# Patient Record
Sex: MALE | Race: WHITE | Employment: FULL TIME | URBAN - METROPOLITAN AREA
[De-identification: names, ages, dates, MRNs, and addresses within clinical notes are randomized per-mention and may not be internally consistent; named-entity substitution may affect disease eponyms.]

---

## 2018-04-19 ENCOUNTER — OFFICE VISIT (OUTPATIENT)
Dept: FAMILY MEDICINE CLINIC | Facility: CLINIC | Age: 28
End: 2018-04-19

## 2018-04-19 VITALS
BODY MASS INDEX: 21 KG/M2 | RESPIRATION RATE: 14 BRPM | DIASTOLIC BLOOD PRESSURE: 70 MMHG | TEMPERATURE: 98 F | HEART RATE: 88 BPM | WEIGHT: 150 LBS | SYSTOLIC BLOOD PRESSURE: 100 MMHG | HEIGHT: 71 IN

## 2018-04-19 DIAGNOSIS — J02.0 STREP PHARYNGITIS: Primary | ICD-10-CM

## 2018-04-19 PROCEDURE — 87880 STREP A ASSAY W/OPTIC: CPT | Performed by: NURSE PRACTITIONER

## 2018-04-19 PROCEDURE — 99202 OFFICE O/P NEW SF 15 MIN: CPT | Performed by: NURSE PRACTITIONER

## 2018-04-19 RX ORDER — AMOXICILLIN 875 MG/1
875 TABLET, COATED ORAL 2 TIMES DAILY
Qty: 20 TABLET | Refills: 0 | Status: SHIPPED | OUTPATIENT
Start: 2018-04-19 | End: 2018-04-29

## 2018-04-19 NOTE — PROGRESS NOTES
CHIEF COMPLAINT:   Patient presents with:  Sore Throat      HPI:   Dangelo Trivedi is a 33year old male who presents with sore throat  for 2 days. Onset was quick ,   Symptoms are progressing. Location is reported as mid throat.   Description is reporte NEURO: denies headaches, numbness, tingling in face, or change in balance.     EXAM:   /70   Pulse 88   Temp 98.3 °F (36.8 °C)   Resp 14   Ht 71\"   Wt 150 lb   BMI 20.92 kg/m²   GENERAL: well developed, well nourished, in no apparent distress  SKIN: instructed to consider themselves contagious until being on antibiotics for 24 hours.   instructed to increase water intake and rest.      Meds & Refills for this Visit:    Signed Prescriptions Disp Refills    amoxicillin 875 MG Oral Tab 20 tablet 0      Si Follow-up care  Follow up with your healthcare provider or our staff if you don't get better over the next week.   When to seek medical advice  Call your healthcare provider right away if any of these occur:  · Fever of 100.4ºF (38ºC) or higher, or as direc

## 2018-04-19 NOTE — PATIENT INSTRUCTIONS
Pharyngitis: Strep (Confirmed)    You have had a positive test for strep throat. Strep throat is a contagious illness. It is spread by coughing, kissing or by touching others after touching your mouth or nose.  Symptoms include throat pain that is worse w · Lymph nodes getting larger or becoming soft in the middle  · You can't swallow liquids or you can't open your mouth wide because of throat pain  · Signs of dehydration. These include very dark urine or no urine, sunken eyes, and dizziness.   · Trouble nico

## 2018-10-11 ENCOUNTER — OFFICE VISIT (OUTPATIENT)
Dept: FAMILY MEDICINE CLINIC | Facility: CLINIC | Age: 28
End: 2018-10-11
Payer: COMMERCIAL

## 2018-10-11 ENCOUNTER — LAB ENCOUNTER (OUTPATIENT)
Dept: LAB | Age: 28
End: 2018-10-11
Attending: FAMILY MEDICINE
Payer: COMMERCIAL

## 2018-10-11 VITALS
SYSTOLIC BLOOD PRESSURE: 137 MMHG | WEIGHT: 157 LBS | HEIGHT: 71 IN | DIASTOLIC BLOOD PRESSURE: 87 MMHG | HEART RATE: 88 BPM | BODY MASS INDEX: 21.98 KG/M2 | TEMPERATURE: 97 F

## 2018-10-11 DIAGNOSIS — F41.9 ANXIETY: ICD-10-CM

## 2018-10-11 DIAGNOSIS — R79.89 ELEVATED LIVER FUNCTION TESTS: ICD-10-CM

## 2018-10-11 DIAGNOSIS — R79.89 ELEVATED LIVER FUNCTION TESTS: Primary | ICD-10-CM

## 2018-10-11 DIAGNOSIS — M62.838 NECK MUSCLE SPASM: ICD-10-CM

## 2018-10-11 DIAGNOSIS — G44.209 TENSION HEADACHE: Primary | ICD-10-CM

## 2018-10-11 PROCEDURE — 99214 OFFICE O/P EST MOD 30 MIN: CPT | Performed by: FAMILY MEDICINE

## 2018-10-11 PROCEDURE — 99212 OFFICE O/P EST SF 10 MIN: CPT | Performed by: FAMILY MEDICINE

## 2018-10-11 PROCEDURE — 80076 HEPATIC FUNCTION PANEL: CPT

## 2018-10-11 PROCEDURE — 36415 COLL VENOUS BLD VENIPUNCTURE: CPT

## 2018-10-11 NOTE — PATIENT INSTRUCTIONS
At this time he will try to cope on his own. If in 1 month not better he should really see me.   Today though he will go downstairs and do a liver function test just to make sure that the liver tests are back to normal.

## 2018-10-11 NOTE — PROGRESS NOTES
Patient ID: Hannah Pradhan is a 33year old male. HPI  Patient presents with:  Migraine    I have not seen him for some years. He states he dreads going to work and has stress with his boss. The headaches can happen randomly.   Sometimes it is in t lb (68 kg)  11/20/15 : 142 lb (64.4 kg)  05/02/14 : 130 lb 3.2 oz (59.1 kg)      BMI Readings from Last 6 Encounters:  10/11/18 : 21.90 kg/m²  04/19/18 : 20.92 kg/m²  11/20/15 : 20.37 kg/m²  05/02/14 : 18.68 kg/m²      BP Readings from Last 6 Encounters: regular rhythm and  normal heart sounds. Pulmonary/Chest: Effort normal and breath sounds normal. No respiratory distress. Lymphadenopathy:     Patient has no cervical adenopathy.    Neurological: Patient is alert and oriented to person, place, and christopher

## 2018-10-12 ENCOUNTER — PATIENT MESSAGE (OUTPATIENT)
Dept: FAMILY MEDICINE CLINIC | Facility: CLINIC | Age: 28
End: 2018-10-12

## 2018-10-12 ENCOUNTER — TELEPHONE (OUTPATIENT)
Dept: OTHER | Age: 28
End: 2018-10-12

## 2018-10-12 NOTE — TELEPHONE ENCOUNTER
From: Jagruti Vyas  To: Delaney Nicole DO  Sent: 10/12/2018 9:54 AM CDT  Subject: Test Results Question    Hi,     Do I schedule this ultrasound with you or am I being referred to a different doctor to get this test done?      Thank you,    Cristina Rogers

## 2018-10-12 NOTE — TELEPHONE ENCOUNTER
LMTCB=transfer to triage, see below. ----- Message from Samina Em DO sent at 10/11/2018 11:09 PM CDT -----  Your liver tests are again elevated. I want to do an ultrasound of your liver just to make sure there is nothing wrong with the liver.   Lilly Bustamante Detail Level: Simple Detail Level: Detailed

## 2018-10-24 ENCOUNTER — APPOINTMENT (OUTPATIENT)
Dept: LAB | Age: 28
End: 2018-10-24
Attending: FAMILY MEDICINE
Payer: COMMERCIAL

## 2018-10-24 ENCOUNTER — HOSPITAL ENCOUNTER (OUTPATIENT)
Dept: ULTRASOUND IMAGING | Age: 28
Discharge: HOME | End: 2018-10-24
Attending: FAMILY MEDICINE
Payer: COMMERCIAL

## 2018-10-24 DIAGNOSIS — R79.89 ELEVATED LIVER FUNCTION TESTS: ICD-10-CM

## 2018-10-24 PROCEDURE — 76705 ECHO EXAM OF ABDOMEN: CPT | Performed by: FAMILY MEDICINE

## 2019-01-04 ENCOUNTER — OFFICE VISIT (OUTPATIENT)
Dept: FAMILY MEDICINE CLINIC | Facility: CLINIC | Age: 29
End: 2019-01-04
Payer: COMMERCIAL

## 2019-01-04 VITALS
SYSTOLIC BLOOD PRESSURE: 150 MMHG | HEART RATE: 115 BPM | DIASTOLIC BLOOD PRESSURE: 100 MMHG | OXYGEN SATURATION: 98 % | TEMPERATURE: 99 F | RESPIRATION RATE: 22 BRPM

## 2019-01-04 DIAGNOSIS — R03.0 ELEVATED BLOOD PRESSURE READING: ICD-10-CM

## 2019-01-04 DIAGNOSIS — J02.8 PHARYNGITIS DUE TO OTHER ORGANISM: Primary | ICD-10-CM

## 2019-01-04 LAB
CONTROL LINE PRESENT WITH A CLEAR BACKGROUND (YES/NO): YES YES/NO
STREP GRP A CUL-SCR: NEGATIVE

## 2019-01-04 PROCEDURE — 99213 OFFICE O/P EST LOW 20 MIN: CPT

## 2019-01-04 PROCEDURE — 87880 STREP A ASSAY W/OPTIC: CPT

## 2019-01-04 PROCEDURE — 87081 CULTURE SCREEN ONLY: CPT

## 2019-01-04 RX ORDER — AMOXICILLIN 875 MG/1
875 TABLET, COATED ORAL 2 TIMES DAILY
Qty: 20 TABLET | Refills: 0 | Status: SHIPPED | OUTPATIENT
Start: 2019-01-04 | End: 2019-02-27 | Stop reason: ALTCHOICE

## 2019-01-04 NOTE — PROGRESS NOTES
Lloyd Brennan is a 30year old male. CHIEF COMPLAINT:   Patient presents with:  Sore Throat: for 3-4 days, with congestion      HPI:   Patient presents with 3-4 day history of sore throat.   Patient reports the following associated symptoms:  nasal conge Comfort measures explained and discussed as listed in Patient Instructions    Follow up in 3-5 days if not improving, condition worsens, or fever greater than or equal to 100.4 persists for 72 hours.       Patient Instructions     Pharyngitis (Sore Throat), · Use throat lozenges or numbing throat sprays to help reduce pain. Gargling with warm salt water will also help reduce throat pain. Dissolve 1/2 teaspoon of salt in 1 glass of warm water. Children can sip on juice or a popsicle.  Children 5 years and older · •Can’t swallow liquids, a lot of drooling, or can’t open mouth wide due to throat pain  · Signs of dehydration, such as very dark urine or no urine, sunken eyes, dizziness  · Trouble breathing or noisy breathing  · Muffled voice  · New rash  · Other symp

## 2019-01-06 ENCOUNTER — TELEPHONE (OUTPATIENT)
Dept: FAMILY MEDICINE CLINIC | Facility: CLINIC | Age: 29
End: 2019-01-06

## 2019-01-06 NOTE — TELEPHONE ENCOUNTER
Left message with Negative Strep results.  Call Center number left- [phone]   Reminded pt to follow up here or with PCP for B/P recheck

## 2019-02-27 ENCOUNTER — OFFICE VISIT (OUTPATIENT)
Dept: FAMILY MEDICINE CLINIC | Facility: CLINIC | Age: 29
End: 2019-02-27
Payer: COMMERCIAL

## 2019-02-27 VITALS
DIASTOLIC BLOOD PRESSURE: 70 MMHG | HEIGHT: 71 IN | HEART RATE: 98 BPM | RESPIRATION RATE: 16 BRPM | SYSTOLIC BLOOD PRESSURE: 110 MMHG | WEIGHT: 160 LBS | TEMPERATURE: 98 F | BODY MASS INDEX: 22.4 KG/M2

## 2019-02-27 DIAGNOSIS — Z20.828 EXPOSURE TO INFLUENZA: ICD-10-CM

## 2019-02-27 DIAGNOSIS — B34.9 VIRAL SYNDROME: Primary | ICD-10-CM

## 2019-02-27 LAB
POCT INFLUENZA A: NEGATIVE
POCT INFLUENZA B: NEGATIVE

## 2019-02-27 PROCEDURE — 99213 OFFICE O/P EST LOW 20 MIN: CPT | Performed by: NURSE PRACTITIONER

## 2019-02-27 PROCEDURE — 87502 INFLUENZA DNA AMP PROBE: CPT | Performed by: NURSE PRACTITIONER

## 2019-02-27 NOTE — PROGRESS NOTES
CHIEF COMPLAINT:   Patient presents with:  Viral Syndrome      HPI:   Jagruti Vyas is a 30year old male who presents with flu like symptoms for  1-2 hours. Onset was quick.  Patient had close contact relative who was diagnosed with influenza on the day HEAD: atraumatic, normocephalic, no tenderness  EYES: conjunctiva clear, EOM intact, normal pupils, PERRLA  EARS: Canals are clear, nodischarge/inflammation, TM's are pearly white and intact, no bulging, bony landmarks are visible.   Fluid is present behind A viral illness may cause a number of symptoms such as fever. Other symptoms depend on the part of the body that the virus affects.  If it settles in your nose, throat, and lungs, it may cause cough, sore throat, congestion, runny nose, headache, earache an · Your appetite may be poor, so a light diet is fine. Avoid dehydration by drinking 8 to 12, 8-ounce glasses of fluids each day.  This may include water; orange juice; lemonade; apple, grape, and cranberry juice; clear fruit drinks; electrolyte replacement © 4537-4204 [address]. [address] [facility] [place], [address] [place]. All rights reserved. This information is not intended as a substitute for professional medical care. Always follow your healthcare professional's instructions.         Antony Gomez · Stay home until your fever has been gone for at least 24 hours without using medicine to reduce fever. Follow-up care  Follow up with your healthcare provider, or as advised, if you are not getting better over the next week.   If you are age 72 or older,

## 2019-02-27 NOTE — PATIENT INSTRUCTIONS
Viral Syndrome (Adult)  A viral illness may cause a number of symptoms such as fever. Other symptoms depend on the part of the body that the virus affects.  If it settles in your nose, throat, and lungs, it may cause cough, sore throat, congestion, runny · Your appetite may be poor, so a light diet is fine. Avoid dehydration by drinking 8 to 12, 8-ounce glasses of fluids each day.  This may include water; orange juice; lemonade; apple, grape, and cranberry juice; clear fruit drinks; electrolyte replacement © 1813-4383 [address]. [address] [facility] [place], [address] [place]. All rights reserved. This information is not intended as a substitute for professional medical care. Always follow your healthcare professional's instructions.         Candace Shi · Stay home until your fever has been gone for at least 24 hours without using medicine to reduce fever. Follow-up care  Follow up with your healthcare provider, or as advised, if you are not getting better over the next week.   If you are age 72 or older,

## 2019-03-01 ENCOUNTER — OFFICE VISIT (OUTPATIENT)
Dept: FAMILY MEDICINE CLINIC | Facility: CLINIC | Age: 29
End: 2019-03-01
Payer: COMMERCIAL

## 2019-03-01 VITALS
OXYGEN SATURATION: 98 % | HEART RATE: 90 BPM | TEMPERATURE: 98 F | RESPIRATION RATE: 20 BRPM | BODY MASS INDEX: 22 KG/M2 | WEIGHT: 157 LBS | SYSTOLIC BLOOD PRESSURE: 120 MMHG | DIASTOLIC BLOOD PRESSURE: 51 MMHG

## 2019-03-01 DIAGNOSIS — J02.8 PHARYNGITIS DUE TO OTHER ORGANISM: Primary | ICD-10-CM

## 2019-03-01 DIAGNOSIS — R05.9 COUGH: ICD-10-CM

## 2019-03-01 DIAGNOSIS — J10.1 INFLUENZA B: ICD-10-CM

## 2019-03-01 LAB
CONTROL LINE PRESENT WITH A CLEAR BACKGROUND (YES/NO): YES YES/NO
POCT INFLUENZA A: NEGATIVE
POCT INFLUENZA B: POSITIVE
STREP GRP A CUL-SCR: NEGATIVE

## 2019-03-01 PROCEDURE — 87880 STREP A ASSAY W/OPTIC: CPT

## 2019-03-01 PROCEDURE — 87081 CULTURE SCREEN ONLY: CPT

## 2019-03-01 PROCEDURE — 99213 OFFICE O/P EST LOW 20 MIN: CPT

## 2019-03-01 PROCEDURE — 87502 INFLUENZA DNA AMP PROBE: CPT

## 2019-03-01 RX ORDER — OSELTAMIVIR PHOSPHATE 75 MG/1
75 CAPSULE ORAL 2 TIMES DAILY
Qty: 10 CAPSULE | Refills: 0 | Status: SHIPPED | OUTPATIENT
Start: 2019-03-01 | End: 2020-05-26

## 2019-03-01 NOTE — PROGRESS NOTES
CHIEF COMPLAINT:   Patient presents with:  Sore Throat: 2 days, strep exposure  Cough/URI    HPI:   Janet Farfan is a 30year old male who presents with upper respiratory symptoms for  2 -3 days.   Pt was seen 2 days ago, with a couple of hours of onset HEAD: atraumatic, normocephalic  EYES: conjunctiva non-injected; no drainage  EARS: TM's -dull, no redness.  no bulging, no retraction, no fluid, bony landmarks visible  NOSE: Nasal passages mildly erythematous but not swollen , has clear d/c  THROAT: oral Anyone can get the flu.  But you are more likely to become infected if you:  · Have a weakened immune system  · Work in a healthcare setting where you may be exposed to flu germs  · Live or work with someone who has the flu  · Haven’t had an annual flu shot · Drink lots of fluids such as water, juice, and warm soup. A good rule is to drink enough so that you urinate your normal amount. · Get plenty of rest.  · Ask your healthcare provider what to take for fever and pain.   · Call your provider if your fever i · At home and work, clean phones, computer keyboards, and toys often with disinfectant wipes. · If possible, avoid close contact with others who have the flu or symptoms of the flu.   Handwashing tips  Handwashing is one of the best ways to prevent many co © 7303-3740 [address]. [address] [facility] [address] [place]. All rights reserved. This information is not intended as a substitute for professional medical care. Always follow your healthcare professional's instructions.         The pat

## 2019-03-02 NOTE — PATIENT INSTRUCTIONS
The Flu (Influenza)     The virus that causes the flu spreads through the air in droplets when someone who has the flu coughs, sneezes, laughs, or talks. The flu (influenza) is an infection that affects your respiratory tract.  This tract is made up of The flu usually gets better after 10 (7-14) days or so, but fatigue and cough can linger another week. In some cases, your healthcare provider may prescribe an antiviral medicine. This may help you get well a little sooner.  For the medicine to help, you ne · One of the best ways to avoid the flu is to get a flu vaccine each year. The virus that causes the flu changes from year to year. For that reason, healthcare providers recommend getting the flu vaccine each year, as soon as it's available in your area.  Irwin Byrne · Rub until the gel is gone and your hands are completely dry. Preventing the flu in healthcare settings  The flu is a special concern for people in hospitals and long-term care facilities.  To help prevent the spread of flu, many hospitals and nursing maría elena

## 2020-05-22 ENCOUNTER — TELEPHONE (OUTPATIENT)
Dept: FAMILY MEDICINE CLINIC | Facility: CLINIC | Age: 30
End: 2020-05-22

## 2020-05-26 ENCOUNTER — OFFICE VISIT (OUTPATIENT)
Dept: FAMILY MEDICINE CLINIC | Facility: CLINIC | Age: 30
End: 2020-05-26
Payer: COMMERCIAL

## 2020-05-26 VITALS
DIASTOLIC BLOOD PRESSURE: 75 MMHG | HEIGHT: 71 IN | HEART RATE: 92 BPM | SYSTOLIC BLOOD PRESSURE: 121 MMHG | BODY MASS INDEX: 23.38 KG/M2 | TEMPERATURE: 98 F | WEIGHT: 167 LBS

## 2020-05-26 DIAGNOSIS — Z00.00 ADULT GENERAL MEDICAL EXAM: Primary | ICD-10-CM

## 2020-05-26 DIAGNOSIS — Z23 NEED FOR VACCINATION: ICD-10-CM

## 2020-05-26 PROCEDURE — 99395 PREV VISIT EST AGE 18-39: CPT | Performed by: FAMILY MEDICINE

## 2020-05-26 PROCEDURE — 90471 IMMUNIZATION ADMIN: CPT | Performed by: FAMILY MEDICINE

## 2020-05-26 PROCEDURE — 90715 TDAP VACCINE 7 YRS/> IM: CPT | Performed by: FAMILY MEDICINE

## 2020-05-26 NOTE — PROGRESS NOTES
Patient ID: Karli Yen is a 35year old male. HPI  Patient presents with:  Physical    Last seen by me in October 2018. He is single and does not smoke. He has no FHx of diabetes or hypertension.     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 Neurological: He is alert and oriented to person, place, and time. He has normal reflexes. No cranial nerve deficit. Skin: Skin is warm and dry. Few acne papules on the face. Psychiatric: He has a normal mood and affect. Vitals reviewed.

## 2021-03-09 ENCOUNTER — OFFICE VISIT (OUTPATIENT)
Dept: ORTHOPEDICS CLINIC | Facility: CLINIC | Age: 31
End: 2021-03-09
Payer: COMMERCIAL

## 2021-03-09 DIAGNOSIS — B35.1 ONYCHOMYCOSIS: Primary | ICD-10-CM

## 2021-03-09 PROCEDURE — 99202 OFFICE O/P NEW SF 15 MIN: CPT | Performed by: PODIATRIST

## 2021-03-09 NOTE — PROGRESS NOTES
[facility] New Patient Note    CC: Patient presents with:  Toenail Fungus: Patient is here today due to having great right toe nail fungus. Patient states that the issue has been on going for about 4 years.       HPI: The patient is a [address] [facility] year ol definitely a topical treatment. Rx for Penlac will be started      Jose. Alex Vizcaino DPM  [facility]      This document was partially prepared using [employer].

## 2021-03-10 PROCEDURE — 87101 SKIN FUNGI CULTURE: CPT | Performed by: PODIATRIST

## 2021-03-10 PROCEDURE — 87107 FUNGI IDENTIFICATION MOLD: CPT | Performed by: PODIATRIST

## 2021-03-25 RX ORDER — TERBINAFINE HYDROCHLORIDE 250 MG/1
250 TABLET ORAL DAILY
Qty: 30 TABLET | Refills: 2 | Status: SHIPPED | OUTPATIENT
Start: 2021-03-25 | End: 2021-06-17

## 2022-10-27 ENCOUNTER — OFFICE VISIT (OUTPATIENT)
Dept: FAMILY MEDICINE CLINIC | Facility: CLINIC | Age: 32
End: 2022-10-27
Payer: COMMERCIAL

## 2022-10-27 ENCOUNTER — LAB ENCOUNTER (OUTPATIENT)
Dept: LAB | Age: 32
End: 2022-10-27
Attending: NURSE PRACTITIONER
Payer: COMMERCIAL

## 2022-10-27 VITALS
BODY MASS INDEX: 20.86 KG/M2 | HEART RATE: 72 BPM | SYSTOLIC BLOOD PRESSURE: 124 MMHG | WEIGHT: 149 LBS | HEIGHT: 71 IN | DIASTOLIC BLOOD PRESSURE: 83 MMHG

## 2022-10-27 DIAGNOSIS — Z00.00 WELL ADULT EXAM: Primary | ICD-10-CM

## 2022-10-27 DIAGNOSIS — H00.015 HORDEOLUM EXTERNUM OF LEFT LOWER EYELID: ICD-10-CM

## 2022-10-27 DIAGNOSIS — Z00.00 WELL ADULT EXAM: ICD-10-CM

## 2022-10-27 LAB
ALBUMIN SERPL-MCNC: 4.5 G/DL (ref 3.4–5)
ALBUMIN/GLOB SERPL: 1.2 {RATIO} (ref 1–2)
ALP LIVER SERPL-CCNC: 83 U/L
ALT SERPL-CCNC: 38 U/L
ANION GAP SERPL CALC-SCNC: 7 MMOL/L (ref 0–18)
AST SERPL-CCNC: 20 U/L (ref 15–37)
BILIRUB SERPL-MCNC: 1 MG/DL (ref 0.1–2)
BUN BLD-MCNC: 15 MG/DL (ref 7–18)
BUN/CREAT SERPL: 13.5 (ref 10–20)
CALCIUM BLD-MCNC: 9.7 MG/DL (ref 8.5–10.1)
CHLORIDE SERPL-SCNC: 105 MMOL/L (ref 98–112)
CHOLEST SERPL-MCNC: 150 MG/DL (ref ?–200)
CO2 SERPL-SCNC: 28 MMOL/L (ref 21–32)
CREAT BLD-MCNC: 1.11 MG/DL
DEPRECATED RDW RBC AUTO: 36.4 FL (ref 35.1–46.3)
ERYTHROCYTE [DISTWIDTH] IN BLOOD BY AUTOMATED COUNT: 11.3 % (ref 11–15)
EST. AVERAGE GLUCOSE BLD GHB EST-MCNC: 105 MG/DL (ref 68–126)
FASTING PATIENT LIPID ANSWER: YES
FASTING STATUS PATIENT QL REPORTED: YES
GFR SERPLBLD BASED ON 1.73 SQ M-ARVRAT: 91 ML/MIN/1.73M2 (ref 60–?)
GLOBULIN PLAS-MCNC: 3.8 G/DL (ref 2.8–4.4)
GLUCOSE BLD-MCNC: 91 MG/DL (ref 70–99)
HBA1C MFR BLD: 5.3 % (ref ?–5.7)
HCT VFR BLD AUTO: 45.3 %
HDLC SERPL-MCNC: 42 MG/DL (ref 40–59)
HGB BLD-MCNC: 15.7 G/DL
LDLC SERPL CALC-MCNC: 93 MG/DL (ref ?–100)
MCH RBC QN AUTO: 30.7 PG (ref 26–34)
MCHC RBC AUTO-ENTMCNC: 34.7 G/DL (ref 31–37)
MCV RBC AUTO: 88.6 FL
NONHDLC SERPL-MCNC: 108 MG/DL (ref ?–130)
OSMOLALITY SERPL CALC.SUM OF ELEC: 290 MOSM/KG (ref 275–295)
PLATELET # BLD AUTO: 239 10(3)UL (ref 150–450)
POTASSIUM SERPL-SCNC: 4.1 MMOL/L (ref 3.5–5.1)
PROT SERPL-MCNC: 8.3 G/DL (ref 6.4–8.2)
RBC # BLD AUTO: 5.11 X10(6)UL
SODIUM SERPL-SCNC: 140 MMOL/L (ref 136–145)
TRIGL SERPL-MCNC: 76 MG/DL (ref 30–149)
TSI SER-ACNC: 1.98 MIU/ML (ref 0.36–3.74)
VIT B12 SERPL-MCNC: 547 PG/ML (ref 193–986)
VIT D+METAB SERPL-MCNC: 27.8 NG/ML (ref 30–100)
VLDLC SERPL CALC-MCNC: 12 MG/DL (ref 0–30)
WBC # BLD AUTO: 5.7 X10(3) UL (ref 4–11)

## 2022-10-27 PROCEDURE — 3008F BODY MASS INDEX DOCD: CPT | Performed by: NURSE PRACTITIONER

## 2022-10-27 PROCEDURE — 80053 COMPREHEN METABOLIC PANEL: CPT

## 2022-10-27 PROCEDURE — 36415 COLL VENOUS BLD VENIPUNCTURE: CPT | Performed by: NURSE PRACTITIONER

## 2022-10-27 PROCEDURE — 85027 COMPLETE CBC AUTOMATED: CPT

## 2022-10-27 PROCEDURE — 3079F DIAST BP 80-89 MM HG: CPT | Performed by: NURSE PRACTITIONER

## 2022-10-27 PROCEDURE — 82607 VITAMIN B-12: CPT

## 2022-10-27 PROCEDURE — 99395 PREV VISIT EST AGE 18-39: CPT | Performed by: NURSE PRACTITIONER

## 2022-10-27 PROCEDURE — 3074F SYST BP LT 130 MM HG: CPT | Performed by: NURSE PRACTITIONER

## 2022-10-27 PROCEDURE — 80061 LIPID PANEL: CPT

## 2022-10-27 PROCEDURE — 83036 HEMOGLOBIN GLYCOSYLATED A1C: CPT

## 2022-10-27 PROCEDURE — 82306 VITAMIN D 25 HYDROXY: CPT | Performed by: NURSE PRACTITIONER

## 2022-10-27 PROCEDURE — 84443 ASSAY THYROID STIM HORMONE: CPT

## 2022-10-27 NOTE — ASSESSMENT & PLAN NOTE
Discussed self care for styes-warm compresses w a few drops of baby shampoo  Do not squeeze area  Please call if symptoms worsen or are not resolving.

## 2022-10-27 NOTE — ASSESSMENT & PLAN NOTE
Screening labs   Please aim to eat a diet high in fresh fruits and vegetables, lean protein sources, complex carbohydrates and limited processed and fast foods. Try to get at least 150 minutes of exercise per week-a combination of weight resistance and cardio is preferred.     Declines covid and influenza vaccines

## 2023-04-18 ENCOUNTER — PATIENT MESSAGE (OUTPATIENT)
Dept: FAMILY MEDICINE CLINIC | Facility: CLINIC | Age: 33
End: 2023-04-18

## 2023-04-19 NOTE — TELEPHONE ENCOUNTER
[employer] message sent to patient in response to [employer] message received--->see message. From: Marcellus Ba  To: Gisselle Martinez DO  Sent: 4/18/2023  3:07 PM CDT  Subject: Referral to Dermatologist    1. I can't find out how to update my medical coverage. I am now in [facility] of [place] and no longer on PPO. Group # [id number]  ID # [id number]  2. Once I get that updated, how can I get a referral to see a dermatologist through [facility] network? Thanks!   Damien Garcia

## 2023-05-03 ENCOUNTER — OFFICE VISIT (OUTPATIENT)
Dept: FAMILY MEDICINE CLINIC | Facility: CLINIC | Age: 33
End: 2023-05-03

## 2023-05-03 VITALS
BODY MASS INDEX: 21.7 KG/M2 | SYSTOLIC BLOOD PRESSURE: 121 MMHG | HEIGHT: 71 IN | HEART RATE: 69 BPM | DIASTOLIC BLOOD PRESSURE: 81 MMHG | WEIGHT: 155 LBS

## 2023-05-03 DIAGNOSIS — Z12.83 SKIN EXAM FOR MALIGNANT NEOPLASM: Primary | ICD-10-CM

## 2024-01-31 ENCOUNTER — OFFICE VISIT (OUTPATIENT)
Dept: FAMILY MEDICINE CLINIC | Facility: CLINIC | Age: 34
End: 2024-01-31
Payer: COMMERCIAL

## 2024-01-31 VITALS
BODY MASS INDEX: 23.8 KG/M2 | HEIGHT: 71 IN | TEMPERATURE: 97 F | DIASTOLIC BLOOD PRESSURE: 72 MMHG | OXYGEN SATURATION: 96 % | RESPIRATION RATE: 16 BRPM | WEIGHT: 170 LBS | SYSTOLIC BLOOD PRESSURE: 108 MMHG | HEART RATE: 93 BPM

## 2024-01-31 DIAGNOSIS — J06.9 VIRAL URI: Primary | ICD-10-CM

## 2024-01-31 LAB
CONTROL LINE PRESENT WITH A CLEAR BACKGROUND (YES/NO): YES YES/NO
KIT LOT #: NORMAL NUMERIC
STREP GRP A CUL-SCR: NEGATIVE

## 2024-01-31 PROCEDURE — 3074F SYST BP LT 130 MM HG: CPT | Performed by: NURSE PRACTITIONER

## 2024-01-31 PROCEDURE — 3078F DIAST BP <80 MM HG: CPT | Performed by: NURSE PRACTITIONER

## 2024-01-31 PROCEDURE — 99213 OFFICE O/P EST LOW 20 MIN: CPT | Performed by: NURSE PRACTITIONER

## 2024-01-31 PROCEDURE — 3008F BODY MASS INDEX DOCD: CPT | Performed by: NURSE PRACTITIONER

## 2024-01-31 PROCEDURE — 87880 STREP A ASSAY W/OPTIC: CPT | Performed by: NURSE PRACTITIONER

## 2024-01-31 NOTE — PROGRESS NOTES
CHIEF COMPLAINT:     Chief Complaint   Patient presents with    Sore Throat     ST x Monday, HA which is worsening,    Denies fever, N/V, abd pain   OTC Ibuprofen, Mucinex   No recent Covid test        HPI:   Amador Sadler is a 34 year old male who presents for upper respiratory symptoms for  2 days. Patient reports sore throat, HA, nasal congestion/rhinorrhea, cough, temp of 99.8F.  Treating symptoms with: Ibuprofen, mucinex.  Denies fever, dyspnea, wheezing, SOB, chest pain, N/V/D, ear pain, abd pain, or rash.  Denies history of asthma, smoking, pneumonia, or COPD.  No known ill contacts.  Denies recent travel.  Tolerating PO.  Hx of covid a couple years ago.      No current outpatient medications on file.      No past medical history on file.   No past surgical history on file.      Social History     Socioeconomic History    Marital status: Single   Tobacco Use    Smoking status: Never    Smokeless tobacco: Never   Vaping Use    Vaping Use: Never used   Substance and Sexual Activity    Alcohol use: Not Currently     Alcohol/week: 0.0 standard drinks of alcohol    Drug use: No   Other Topics Concern    Caffeine Concern Yes     Comment: Coffee         REVIEW OF SYSTEMS:   GENERAL: feels well otherwise, normal appetite  SKIN: no rashes or abnormal skin lesions  HEENT: See HPI  LUNGS: denies shortness of breath or wheezing, See HPI  CARDIOVASCULAR: denies chest pain or palpitations   GI: denies N/V/C or abdominal pain  NEURO: Denies headaches    EXAM:   /72   Pulse 93   Temp 97.1 °F (36.2 °C)   Resp 16   Ht 5' 11\" (1.803 m)   Wt 170 lb (77.1 kg)   SpO2 96%   BMI 23.71 kg/m²   GENERAL: well developed, well nourished, and in no apparent distress  SKIN: no rashes, no suspicious lesions  HEAD: atraumatic, normocephalic.    EYES: conjunctiva clear, EOM intact  EARS: TM's normal, no bulging, no retraction,no fluid, bony landmarks visible  NOSE: Nostrils patent, +clear nasal discharge, nasal mucosa pink and  +inflamed  THROAT: Oral mucosa pink, moist. Posterior pharynx is+ erythematous. No exudates.   NECK: Supple, non-tender  LUNGS: clear to auscultation bilaterally, no wheezes or rhonchi. Breathing is non labored. +Dry cough.  CARDIO: RRR without murmur.  LYMPH: No lymphadenopathy.        ASSESSMENT AND PLAN:   Amador Sadler is a 34 year old male who presents with     ASSESSMENT:   Encounter Diagnosis   Name Primary?    Viral URI Yes       PLAN:   - Negative rapid strep, declines culture.  - Declines covid test, encouraged home testing.  - Discussed OTC/comfort care as described in Patient Instructions  - Advised F/U visit if no improvement/worsening within 1 week; sooner if new fever or worsening breathing.  To ER if ever dyspnea, chest pain, SOB, dehydration.  - Pt verbalizes understanding and is agreeable w/ plan.    Meds & Refills for this Visit:  Requested Prescriptions      No prescriptions requested or ordered in this encounter       Risks, benefits, and side effects of medication explained and discussed.  Pt verbalizes understanding.    There are no Patient Instructions on file for this visit.

## 2024-05-27 ENCOUNTER — HOSPITAL ENCOUNTER (OUTPATIENT)
Age: 34
Discharge: HOME | End: 2024-05-27

## 2024-05-27 VITALS
WEIGHT: 170 LBS | RESPIRATION RATE: 16 BRPM | OXYGEN SATURATION: 100 % | HEART RATE: 87 BPM | SYSTOLIC BLOOD PRESSURE: 143 MMHG | HEIGHT: 70 IN | BODY MASS INDEX: 24.34 KG/M2 | DIASTOLIC BLOOD PRESSURE: 88 MMHG | TEMPERATURE: 98 F

## 2024-05-27 DIAGNOSIS — L98.9 SKIN ABNORMALITY: Primary | ICD-10-CM

## 2024-05-27 PROCEDURE — 99213 OFFICE O/P EST LOW 20 MIN: CPT | Performed by: NURSE PRACTITIONER

## 2024-05-27 RX ORDER — VALACYCLOVIR HYDROCHLORIDE 1 G/1
1000 TABLET, FILM COATED ORAL 3 TIMES DAILY
Qty: 21 TABLET | Refills: 0 | Status: SHIPPED | OUTPATIENT
Start: 2024-05-27 | End: 2024-06-03

## 2024-05-27 RX ORDER — DOXYCYCLINE HYCLATE 100 MG/1
100 CAPSULE ORAL 2 TIMES DAILY
Qty: 14 CAPSULE | Refills: 0 | Status: SHIPPED | OUTPATIENT
Start: 2024-05-27 | End: 2024-06-03

## 2024-05-27 RX ORDER — ERYTHROMYCIN 5 MG/G
1 OINTMENT OPHTHALMIC EVERY 6 HOURS
Qty: 1 EACH | Refills: 0 | Status: SHIPPED | OUTPATIENT
Start: 2024-05-27 | End: 2024-06-03

## 2024-05-27 RX ORDER — ERYTHROMYCIN 5 MG/G
1 OINTMENT OPHTHALMIC EVERY 6 HOURS
Qty: 1 EACH | Refills: 0 | Status: SHIPPED | OUTPATIENT
Start: 2024-05-27 | End: 2024-05-27

## 2024-05-27 RX ORDER — VALACYCLOVIR HYDROCHLORIDE 1 G/1
1000 TABLET, FILM COATED ORAL 3 TIMES DAILY
Qty: 21 TABLET | Refills: 0 | Status: SHIPPED | OUTPATIENT
Start: 2024-05-27 | End: 2024-05-27

## 2024-05-27 RX ORDER — DOXYCYCLINE HYCLATE 100 MG/1
100 CAPSULE ORAL 2 TIMES DAILY
Qty: 14 CAPSULE | Refills: 0 | Status: SHIPPED | OUTPATIENT
Start: 2024-05-27 | End: 2024-05-27

## 2024-05-27 RX ORDER — PURIFIED WATER 986 MG/ML
1 SOLUTION OPHTHALMIC ONCE
Status: COMPLETED | OUTPATIENT
Start: 2024-05-27 | End: 2024-05-27

## 2024-05-27 NOTE — ED PROVIDER NOTES
Patient Seen in: [facility]      History     Chief Complaint   Patient presents with    Eye Problem     Sty on eye Rash above eyebrow Skin near eye is tender - Entered by patient    Rash     Stated Complaint: Eye Problem - Sty on eye   Subjective:   35 y/o healthy male presents for multiple complaints. [place] noticed a stye on the right upper eyelid along with an area to right forehead/temporal area the size of a quarter that is red, swollen, with some scabbing. No active drainage or bleeding. No fevers. No pain to the eyeball. No drainage or redness to the eyeball.  No orbital redness, pain, or swelling.  The patient is opening closing the eye without difficulty.  He does generally wear contact lenses, but has not been.  No change or loss of vision.  No fevers.  He does have some pain to the right side of his face.  He appears well and nontoxic.      Objective:   History reviewed. No pertinent past medical history.         History reviewed. No pertinent surgical history.           Social History     Socioeconomic History    Marital status: Single   Tobacco Use    Smoking status: Never    Smokeless tobacco: Never   Vaping Use    Vaping status: Never Used   Substance and Sexual Activity    Alcohol use: Not Currently     Alcohol/week: 0.0 standard drinks of alcohol    Drug use: No   Other Topics Concern    Caffeine Concern Yes     Comment: Coffee            Review of Systems    Positive for stated complaint: Eye Problem - Sty on eye   Other systems are as noted in HPI.  Constitutional and vital signs reviewed.      All other systems reviewed and negative except as noted above.    Physical Exam     ED Triage Vitals [05/27/24 1451]   /88   Pulse 87   Resp 16   Temp 97.7 °F (36.5 °C)   Temp src Temporal   SpO2 100 %   O2 Device None (Room air)     Current:/88   Pulse 87   Temp 97.7 °F (36.5 °C) (Temporal)   Resp 16   Ht 177.8 cm (5' 10\")   Wt 77.1  kg   SpO2 100%   BMI 24.39 kg/m²     Physical Exam  Vitals and nursing note reviewed.   Constitutional:       General: He is not in acute distress.     Appearance: Normal appearance. He is not toxic-appearing.   HENT:      Head: Normocephalic.      Nose: Nose normal.      Mouth/Throat:      Mouth: Mucous membranes are moist.   Eyes:      General: Vision grossly intact. No visual field deficit.        Right eye: No foreign body or discharge.         Left eye: No foreign body, discharge or hordeolum.      Extraocular Movements:      Right eye: Normal extraocular motion and no nystagmus.      Left eye: Normal extraocular motion and no nystagmus.      Conjunctiva/sclera:      Right eye: Right conjunctiva is not injected. No chemosis, exudate or hemorrhage.     Left eye: Left conjunctiva is not injected. No chemosis, exudate or hemorrhage.     Pupils: Pupils are equal, round, and reactive to light.        Comments: There is what appears like a stye to the right upper inner eyelid.  No active pus drainage.  Mild surrounding redness.   Neck:      Comments: Cervical lymphadenopathy to the right side of the neck.  Cardiovascular:      Rate and Rhythm: Normal rate and regular rhythm.   Pulmonary:      Effort: Pulmonary effort is normal.      Breath sounds: Normal breath sounds.   Musculoskeletal:         General: Normal range of motion.      Cervical back: Normal range of motion and neck supple.   Lymphadenopathy:      Cervical: Cervical adenopathy present.   Skin:     General: Skin is warm.      Capillary Refill: Capillary refill takes less than 2 seconds.      Findings: Rash present.      Comments: Circular red raised area the size of a quarter to the right forehead/temporal area.  There is some overlying scabbing present.  Pain to the right side of the face, CMS intact, no other rashes or lesions visible.   Neurological:      General: No focal deficit present.      Mental Status: He is alert and oriented to person,  place, and time.      Cranial Nerves: No cranial nerve deficit.      Sensory: No sensory deficit.      Motor: No weakness.      Gait: Gait normal.   Psychiatric:         Mood and Affect: Mood normal.         Behavior: Behavior normal.         ED Course   No results found.  Labs Reviewed - No data to display    MDM     Medical Decision Making  Right eye was stained with fluorescein.  No dendrites or other abnormalities visible with the bluelight.  Due to differential diagnoses being a stye with a skin infection versus shingles, I will presumptively treat for both.  I will place on a course of Valtrex along with a course of doxycycline and erythromycin ophthalmic ointment to apply to the right upper eyelid.  We discussed applying warm compresses, avoiding wearing his contact lenses, and close follow-up with his primary care doctor and ophthalmology.  He is aware for any change in vision or any other worsening or concerning symptoms, to go to the nearest emergency department for further evaluation.    Risk  OTC drugs.  Prescription drug management.  Risk Details: Shingles versus stye with cellulitis        Disposition and Plan     Clinical Impression:  1. Skin abnormality         Disposition:  Discharge  5/27/2024  3:13 pm    Follow-up:  Cristian Cisse DO  [address]  [facility] [address]  [phone]    Call   to arrange close follow up    Edgar Sewell MD  [address] [facility]  [facility] [address] [phone]    Call   to arrange follow up as needed          Medications Prescribed:  Discharge Medication List as of 5/27/2024  3:13 PM        START taking these medications    Details   valACYclovir 1 G Oral Tab Take 1 tablet (1,000 mg total) by mouth 3 (three) times daily for 7 days., Normal, Disp-21 tablet, R-0      doxycycline 100 MG Oral Cap Take 1 capsule (100 mg total) by mouth 2 (two) times daily for 7 days., Normal, Disp-14 capsule, R-0      erythromycin 5 MG/GM Ophthalmic Ointment  Place 1 Application into the right eye every 6 (six) hours for 7 days., Normal, Disp-1 each, R-0

## 2024-05-27 NOTE — DISCHARGE INSTRUCTIONS
Avoid touching the area.  Warm compresses to the right upper eyelid.  Take the oral medication as prescribed.  Apply the ointment as prescribed.  Follow-up with your doctor and ophthalmology as needed.  Return for any concerns.

## 2024-05-27 NOTE — ED INITIAL ASSESSMENT (HPI)
Pt presents to the IC with c/o a stye to the right eye for the last 2 days, and now tenderness to the right side of his face with a rash at his temple. +tenderness. No vision changes. No fevers.

## 2024-06-09 PROBLEM — H00.011 HORDEOLUM EXTERNUM OF RIGHT UPPER EYELID: Status: ACTIVE | Noted: 2024-06-09

## 2024-06-09 PROBLEM — L98.9 SKIN ABNORMALITY: Status: ACTIVE | Noted: 2024-06-09

## 2024-06-09 NOTE — PROGRESS NOTES
Subjective:   Amador Sadler is a 34 year old male who presents for Urgent Care F/u (5/27/24 right sty on eye & rash above eyebrows: its better , lymph nodes still a lil swollen right side neck)   Patient is a pleasant 34-year-old male with no significant past medical history.  Patient presents to office today for follow-up from recent urgent care visit on 5/27/2024.  Patient was seen in urgent care on Sunday for a stye on right eye with associated 6 rash and tenderness to skin adjacent.  Patient reports she was in the area and noticed a stye in the right upper eyelid near inner canthus, he then also noticed swelling with redness, with scabbing the size of a quarter to the right temporal area.  Patient does report wearing contacts has not been wearing at this time.  Due to proximity to eye and unable to differentiate between shingles and simple infection.  Patient was treated for both after fluorescein eye stain was negative.  Patient was started on valacyclovir 1 g 3 times daily x 7 days.  Erythromycin eyedrops.  And started on doxycycline 100 mg twice daily x 7 days.  He was sent home on meds and educated on warm compresses. Close follow up with PCP and referral to [facility]. Patient follows up in office today and states eye is getting better. Outer portion has crusted and scabbed. Has not seen eye doctor, Has been wearing contacts for past week. Denies eye pain, change in vision and sensitivity to light.         History reviewed. No pertinent past medical history.   History reviewed. No pertinent surgical history.     History/Other:    Chief Complaint Reviewed and Verified  Nursing Notes Reviewed and   Verified  Tobacco Reviewed  Allergies Reviewed  Medications Reviewed    Problem List Reviewed  Medical History Reviewed  Surgical History   Reviewed  Family History Reviewed  Social History Reviewed         Tobacco:  He has never smoked tobacco.    No current outpatient medications on file.          Review of Systems:  Review of Systems   Eyes:  Negative for photophobia, pain, discharge, redness, itching and visual disturbance.   Respiratory:  Negative for shortness of breath.    Cardiovascular:  Negative for chest pain.   Skin:  Positive for color change and rash.   All other systems reviewed and are negative.        Objective:   /75 (BP Location: Right arm, Patient Position: Sitting, Cuff Size: large)   Pulse 74   Ht 5' 10\" (1.778 m)   Wt 170 lb 6.4 oz (77.3 kg)   BMI 24.45 kg/m²  Estimated body mass index is 24.45 kg/m² as calculated from the following:    Height as of this encounter: 5' 10\" (1.778 m).    Weight as of this encounter: 170 lb 6.4 oz (77.3 kg).  Physical Exam  Vitals and nursing note reviewed.   Constitutional:       Appearance: Normal appearance. He is normal weight.   HENT:      Head:        Comments: Scabbed over vesicular rash to right temple area.   Eyes:      General: No allergic shiner or visual field deficit.        Right eye: Hordeolum present.      Extraocular Movements: Extraocular movements intact.      Conjunctiva/sclera: Conjunctivae normal.      Pupils: Pupils are equal, round, and reactive to light.     Cardiovascular:      Rate and Rhythm: Normal rate and regular rhythm.      Pulses: Normal pulses.      Heart sounds: Normal heart sounds.   Pulmonary:      Breath sounds: Normal breath sounds.   Musculoskeletal:      Cervical back: Normal range of motion and neck supple. No rigidity.   Skin:     General: Skin is warm and dry.      Capillary Refill: Capillary refill takes less than 2 seconds.      Findings: Lesion and rash present.      Comments: Scabbed over vesicular lesion right temporal area.   Neurological:      Mental Status: He is alert.           Assessment & Plan:   1. Hordeolum externum of right upper eyelid (Primary)  Assessment & Plan:  Start with conservative management with warm compresses.  Apply warm, moist compresses to affected area for 5 to 10  minutes four times a day to facilitate drainage.  Eyelid massage following compresses can also promote drainage.  If the hordeolum(stye) does not reduce in size within two weeks, I can refer you to an ophthalmologist for potential incision and drainage.  Abstain from contact use while inflamed.     Orders:  -     Ophthalmology Referral - In Network  2. Skin abnormality  Assessment & Plan:  Shingles vs cellulitis  Likely shingles with vesicular type rash to outer right orbit in temporal area.  Has completed 1 g valcylcovir tid x 7 days.  Rash has crusted over. Healing   Denies issues with vision, no pain, no sensitivity to light, sclera white.  Encouraged patient to schedule follow up with Ophthalmology as well. Referral provided.  Red flags reviewed.   Patient aware of plan of care. All questions answered to satisfaction of the patient. Patient instructed to call office or reach out via [employer] if any issues arise. For urgent issues and/or reviewed red flags please proceed to the urgent care or ER.  Also, inform the nurse practitioner with any new symptoms or medication side effects.      Orders:  -     Ophthalmology Referral - In Network        Return for Annual physical.    HAIM Jones, 6/9/2024, 11:01 AM

## 2024-06-10 ENCOUNTER — OFFICE VISIT (OUTPATIENT)
Dept: FAMILY MEDICINE CLINIC | Facility: CLINIC | Age: 34
End: 2024-06-10

## 2024-06-10 VITALS
DIASTOLIC BLOOD PRESSURE: 75 MMHG | WEIGHT: 170.38 LBS | HEIGHT: 70 IN | SYSTOLIC BLOOD PRESSURE: 108 MMHG | BODY MASS INDEX: 24.39 KG/M2 | HEART RATE: 74 BPM

## 2024-06-10 DIAGNOSIS — H00.011 HORDEOLUM EXTERNUM OF RIGHT UPPER EYELID: Primary | ICD-10-CM

## 2024-06-10 DIAGNOSIS — L98.9 SKIN ABNORMALITY: ICD-10-CM

## 2024-06-10 PROCEDURE — 3074F SYST BP LT 130 MM HG: CPT

## 2024-06-10 PROCEDURE — 99213 OFFICE O/P EST LOW 20 MIN: CPT

## 2024-06-10 PROCEDURE — 3008F BODY MASS INDEX DOCD: CPT

## 2024-06-10 PROCEDURE — 3078F DIAST BP <80 MM HG: CPT

## 2024-06-10 NOTE — ASSESSMENT & PLAN NOTE
Shingles vs cellulitis  Likely shingles with vesicular type rash to outer right orbit in temporal area.  Has completed 1 g valcylcovir tid x 7 days.  Rash has crusted over. Healing   Denies issues with vision, no pain, no sensitivity to light, sclera white.  Encouraged patient to schedule follow up with Ophthalmology as well. Referral provided.  Red flags reviewed.   Patient aware of plan of care. All questions answered to satisfaction of the patient. Patient instructed to call office or reach out via [employer] [facility] if any issues arise. For urgent issues and/or reviewed red flags please proceed to the urgent care or ER.  Also, inform the nurse practitioner with any new symptoms or medication side effects.

## 2024-06-10 NOTE — ASSESSMENT & PLAN NOTE
Start with conservative management with warm compresses.  Apply warm, moist compresses to affected area for 5 to 10 minutes four times a day to facilitate drainage.  Eyelid massage following compresses can also promote drainage.  If the hordeolum(stye) does not reduce in size within two weeks, I can refer you to an ophthalmologist for potential incision and drainage.  Abstain from contact use while inflamed.

## 2024-06-13 ENCOUNTER — TELEPHONE (OUTPATIENT)
Dept: CASE MANAGEMENT | Age: 34
End: 2024-06-13

## 2024-06-13 DIAGNOSIS — L98.9 SKIN ABNORMALITY: Primary | ICD-10-CM

## 2024-06-13 DIAGNOSIS — H00.011 HORDEOLUM EXTERNUM OF RIGHT UPPER EYELID: ICD-10-CM

## 2024-06-13 NOTE — TELEPHONE ENCOUNTER
Dr. Ladd,     Patient is requesting your recommendation for another in [facility] eye doctor.     [id number] referral is to Dr. Sewell and he is no longer accepting new patients, please provide the name of the specialist you recommend on referral.     Thank you

## 2024-06-25 ENCOUNTER — LAB ENCOUNTER (OUTPATIENT)
Dept: LAB | Age: 34
End: 2024-06-25
Attending: OPHTHALMOLOGY

## 2024-06-25 DIAGNOSIS — B02.39 SHINGLES OF EYELID: Primary | ICD-10-CM

## 2024-06-25 DIAGNOSIS — B02.39 SHINGLES OF EYELID: ICD-10-CM

## 2024-06-25 LAB
BASOPHILS # BLD AUTO: 0.04 X10(3) UL (ref 0–0.2)
BASOPHILS NFR BLD AUTO: 0.7 %
DEPRECATED RDW RBC AUTO: 37.9 FL (ref 35.1–46.3)
EOSINOPHIL # BLD AUTO: 0.21 X10(3) UL (ref 0–0.7)
EOSINOPHIL NFR BLD AUTO: 3.8 %
ERYTHROCYTE [DISTWIDTH] IN BLOOD BY AUTOMATED COUNT: 12 % (ref 11–15)
HCT VFR BLD AUTO: 41.1 %
HGB BLD-MCNC: 14.4 G/DL
IMM GRANULOCYTES # BLD AUTO: 0.01 X10(3) UL (ref 0–1)
IMM GRANULOCYTES NFR BLD: 0.2 %
LYMPHOCYTES # BLD AUTO: 2.28 X10(3) UL (ref 1–4)
LYMPHOCYTES NFR BLD AUTO: 41 %
MCH RBC QN AUTO: 30.2 PG (ref 26–34)
MCHC RBC AUTO-ENTMCNC: 35 G/DL (ref 31–37)
MCV RBC AUTO: 86.2 FL
MONOCYTES # BLD AUTO: 0.53 X10(3) UL (ref 0.1–1)
MONOCYTES NFR BLD AUTO: 9.5 %
NEUTROPHILS # BLD AUTO: 2.49 X10 (3) UL (ref 1.5–7.7)
NEUTROPHILS # BLD AUTO: 2.49 X10(3) UL (ref 1.5–7.7)
NEUTROPHILS NFR BLD AUTO: 44.8 %
PLATELET # BLD AUTO: 199 10(3)UL (ref 150–450)
RBC # BLD AUTO: 4.77 X10(6)UL
WBC # BLD AUTO: 5.6 X10(3) UL (ref 4–11)

## 2024-06-25 PROCEDURE — 36415 COLL VENOUS BLD VENIPUNCTURE: CPT

## 2024-06-25 PROCEDURE — 87389 HIV-1 AG W/HIV-1&-2 AB AG IA: CPT

## 2024-06-25 PROCEDURE — 85025 COMPLETE CBC W/AUTO DIFF WBC: CPT

## 2024-07-01 ENCOUNTER — MED REC SCAN ONLY (OUTPATIENT)
Dept: FAMILY MEDICINE CLINIC | Facility: CLINIC | Age: 34
End: 2024-07-01

## 2024-07-05 ENCOUNTER — MED REC SCAN ONLY (OUTPATIENT)
Dept: FAMILY MEDICINE CLINIC | Facility: CLINIC | Age: 34
End: 2024-07-05

## 2024-12-12 ENCOUNTER — APPOINTMENT (OUTPATIENT)
Dept: GENERAL RADIOLOGY | Age: 34
End: 2024-12-12
Attending: NURSE PRACTITIONER
Payer: COMMERCIAL

## 2024-12-12 ENCOUNTER — HOSPITAL ENCOUNTER (OUTPATIENT)
Age: 34
Discharge: HOME | End: 2024-12-12
Payer: COMMERCIAL

## 2024-12-12 VITALS
RESPIRATION RATE: 18 BRPM | HEART RATE: 85 BPM | DIASTOLIC BLOOD PRESSURE: 84 MMHG | OXYGEN SATURATION: 99 % | TEMPERATURE: 97 F | SYSTOLIC BLOOD PRESSURE: 128 MMHG

## 2024-12-12 DIAGNOSIS — R07.9 CHEST PAIN OF UNCERTAIN ETIOLOGY: Primary | ICD-10-CM

## 2024-12-12 LAB
#MXD IC: 0.5 X10ˆ3/UL (ref 0.1–1)
ATRIAL RATE: 84 BPM
BUN BLD-MCNC: 14 MG/DL (ref 7–18)
CHLORIDE BLD-SCNC: 102 MMOL/L (ref 98–112)
CO2 BLD-SCNC: 25 MMOL/L (ref 21–32)
CREAT BLD-MCNC: 1.1 MG/DL
DDIMER WHOLE BLOOD: <200 NG/ML DDU (ref ?–400)
EGFRCR SERPLBLD CKD-EPI 2021: 91 ML/MIN/1.73M2 (ref 60–?)
GLUCOSE BLD-MCNC: 100 MG/DL (ref 70–99)
HCT VFR BLD AUTO: 42.3 %
HCT VFR BLD CALC: 45 %
HGB BLD-MCNC: 14.5 G/DL
ISTAT IONIZED CALCIUM FOR CHEM 8: 1.21 MMOL/L (ref 1.12–1.32)
LYMPHOCYTES # BLD AUTO: 1.6 X10ˆ3/UL (ref 1–4)
LYMPHOCYTES NFR BLD AUTO: 26 %
MCH RBC QN AUTO: 29.5 PG (ref 26–34)
MCHC RBC AUTO-ENTMCNC: 34.3 G/DL (ref 31–37)
MCV RBC AUTO: 86.2 FL (ref 80–100)
MIXED CELL %: 8.1 %
NEUTROPHILS # BLD AUTO: 4.2 X10ˆ3/UL (ref 1.5–7.7)
NEUTROPHILS NFR BLD AUTO: 65.9 %
P AXIS: 57 DEGREES
P-R INTERVAL: 142 MS
PLATELET # BLD AUTO: 234 X10ˆ3/UL (ref 150–450)
POTASSIUM BLD-SCNC: 3.9 MMOL/L (ref 3.6–5.1)
Q-T INTERVAL: 366 MS
QRS DURATION: 80 MS
QTC CALCULATION (BEZET): 432 MS
R AXIS: 62 DEGREES
RBC # BLD AUTO: 4.91 X10ˆ6/UL
SODIUM BLD-SCNC: 140 MMOL/L (ref 136–145)
T AXIS: 26 DEGREES
TROPONIN I BLD-MCNC: <0.02 NG/ML
VENTRICULAR RATE: 84 BPM
WBC # BLD AUTO: 6.3 X10ˆ3/UL (ref 4–11)

## 2024-12-12 PROCEDURE — 71046 X-RAY EXAM CHEST 2 VIEWS: CPT | Performed by: NURSE PRACTITIONER

## 2024-12-12 NOTE — ED PROVIDER NOTES
He    Patient Seen in: [facility]      History     Chief Complaint   Patient presents with    Chest Pain     Entered by patient     Stated Complaint: Chest Pain  Subjective:   34-year-old healthy male presents for chest pain that started yesterday.  He was having intermittent chest pain to the left side of the chest.  He states a few days prior he did have some atraumatic left shoulder discomfort.  He states this morning he woke up with some tingling down the left arm.  No weakness.  No headaches.  No dizziness.  No shortness of breath.  No URI symptoms or recent illness.  No skin abnormalities.  No diaphoresis.  No nausea or vomiting.  He states his father has a history of atrial fibrillation, but no other family cardiac history.  No personal cardiac history.  He denies any alcohol, drugs, or tobacco.  He appears nontoxic.      Objective:   History reviewed. No pertinent past medical history.         History reviewed. No pertinent surgical history.           Social History     Socioeconomic History    Marital status: Single   Tobacco Use    Smoking status: Never     Passive exposure: Never    Smokeless tobacco: Never   Vaping Use    Vaping status: Never Used   Substance and Sexual Activity    Alcohol use: Not Currently     Alcohol/week: 0.0 standard drinks of alcohol    Drug use: No   Other Topics Concern    Caffeine Concern Yes     Comment: Coffee            Review of Systems    Positive for stated complaint: Chest Pain (Entered by patient)     Other systems are as noted in HPI.  Constitutional and vital signs reviewed.      All other systems reviewed and negative except as noted above.    Physical Exam     ED Triage Vitals [12/12/24 0818]   /84   Pulse 85   Resp 18   Temp 97.2 °F (36.2 °C)   Temp src Oral   SpO2 99 %   O2 Device None (Room air)     Current:/84   Pulse 85   Temp 97.2 °F (36.2 °C) (Oral)   Resp 18   SpO2 99%     Physical Exam  Vitals and nursing note reviewed.    Constitutional:       General: He is not in acute distress.     Appearance: Normal appearance. He is not toxic-appearing.   HENT:      Head: Normocephalic.      Nose: Nose normal.      Mouth/Throat:      Mouth: Mucous membranes are moist.   Eyes:      Pupils: Pupils are equal, round, and reactive to light.   Cardiovascular:      Rate and Rhythm: Normal rate and regular rhythm.   Pulmonary:      Effort: Pulmonary effort is normal.      Breath sounds: Normal breath sounds. No wheezing, rhonchi or rales.      Comments: Point tenderness to the left upper chest.  Chest:      Chest wall: Tenderness present.   Abdominal:      Palpations: Abdomen is soft.      Tenderness: There is no abdominal tenderness.   Musculoskeletal:         General: Normal range of motion.      Cervical back: Normal range of motion and neck supple.   Skin:     General: Skin is warm and dry.      Capillary Refill: Capillary refill takes less than 2 seconds.      Coloration: Skin is not pale.      Findings: No rash.   Neurological:      General: No focal deficit present.      Mental Status: He is alert and oriented to person, place, and time.      Cranial Nerves: No cranial nerve deficit.      Sensory: No sensory deficit.      Motor: No weakness.      Coordination: Coordination normal.      Gait: Gait normal.      Comments: +5 strength upper and lower extremities.  No focal deficits.   Psychiatric:         Mood and Affect: Mood normal.         Behavior: Behavior normal.         ED Course   XR CHEST PA + LAT CHEST (CPT=71046)    Result Date: 12/12/2024  CONCLUSION: No acute cardiopulmonary disease.    Dictated by (CST): Quan Collado MD on 12/12/2024 at 9:02 AM     Finalized by (CST): Quan Collado MD on 12/12/2024 at 9:03 AM         Labs Reviewed   POCT ISTAT CHEM8 CARTRIDGE - Abnormal; Notable for the following components:       Result Value    ISTAT Glucose 100 (*)     All other components within normal limits   D-DIMER (POC) - Normal    ISTAT TROPONIN - Normal   POCT CBC       MDM     Medical Decision Making  The patient's lab work is normal.  His chest x-ray is negative.  The EKG shows no ischemic changes.  The patient is aware of all of these testing results.  We discussed that the pain may be muscular.  We discussed trying ibuprofen and rest.  He will make a close follow-up appointment with his primary care doctor as he is aware if this pain persists or continues, he may need further outpatient testing done.  He is also aware for any worsening or concerning symptoms, to go to the nearest emergency department for further evaluation.    Amount and/or Complexity of Data Reviewed  Labs: ordered.     Details: The CBC, i-STAT, troponin, and D-dimer all unremarkable.  Radiology: ordered and independent interpretation performed.     Details: I visualized the chest x-ray images which are negative for any acute cardiopulmonary process.  ECG/medicine tests: ordered.     Details: HR 84  NSR with sinus arrhythmia  No ST elevation or ischemic changes  No previous to compare to  Discussion of management or test interpretation with external provider(s): I discussed this patient with Dr. Mcdowell.  He agrees with the plan of care.    Risk  OTC drugs.  Risk Details: Musculoskeletal chest pain versus acute MI versus acute PE      Heart Score:    HEART Score      Title      Criteria Score   Age: <45 Age Score: 0    Hx Score: 0     EKG: Normal EKG Score: 0   HTN: No   Hypercholesterolemia: No   Atherosclerosis/PVD: No     DM: No   BMI>30kg/m2: No   Smoking: No   Family History: No         Other Risk Factor Score: 0               Lab Results   Component Value Date    ITROP <0.02 12/12/2024         HEART Score: 0        Risk of adverse cardiac event is 0.9-1.7%             Disposition and Plan     Clinical Impression:  1. Chest pain of uncertain etiology         Disposition:  Discharge  12/12/2024  9:20 am    Follow-up:  Cristian Cisse DO  [address] [place] [address]  [address]  [facility] [place] [address]  [phone]    Call   to arrange follow up appointment          Medications Prescribed:  There are no discharge medications for this patient.

## 2024-12-12 NOTE — ED INITIAL ASSESSMENT (HPI)
Patient reports left sided chest pain that began yesterday, and states he woke up with his left arm numb that hasn't resolved.

## 2024-12-12 NOTE — DISCHARGE INSTRUCTIONS
Rest. Motrin as needed for pain. Make a close follow up appointment with your PMD. If you develop any worsening or concerning symptoms, go to the ED for further evaluation.

## 2025-02-17 ENCOUNTER — OFFICE VISIT (OUTPATIENT)
Dept: FAMILY MEDICINE CLINIC | Facility: CLINIC | Age: 35
End: 2025-02-17
Payer: COMMERCIAL

## 2025-02-17 VITALS
WEIGHT: 164 LBS | OXYGEN SATURATION: 98 % | HEIGHT: 70 IN | SYSTOLIC BLOOD PRESSURE: 128 MMHG | RESPIRATION RATE: 16 BRPM | DIASTOLIC BLOOD PRESSURE: 82 MMHG | TEMPERATURE: 98 F | HEART RATE: 74 BPM | BODY MASS INDEX: 23.48 KG/M2

## 2025-02-17 DIAGNOSIS — J40 BRONCHITIS: Primary | ICD-10-CM

## 2025-02-17 PROCEDURE — 3074F SYST BP LT 130 MM HG: CPT | Performed by: NURSE PRACTITIONER

## 2025-02-17 PROCEDURE — 3008F BODY MASS INDEX DOCD: CPT | Performed by: NURSE PRACTITIONER

## 2025-02-17 PROCEDURE — 3079F DIAST BP 80-89 MM HG: CPT | Performed by: NURSE PRACTITIONER

## 2025-02-17 PROCEDURE — 99213 OFFICE O/P EST LOW 20 MIN: CPT | Performed by: NURSE PRACTITIONER

## 2025-02-17 RX ORDER — PREDNISONE 20 MG/1
40 TABLET ORAL DAILY
Qty: 10 TABLET | Refills: 0 | Status: SHIPPED | OUTPATIENT
Start: 2025-02-17 | End: 2025-02-22

## 2025-02-17 RX ORDER — BENZONATATE 200 MG/1
200 CAPSULE ORAL 3 TIMES DAILY PRN
Qty: 30 CAPSULE | Refills: 0 | Status: SHIPPED | OUTPATIENT
Start: 2025-02-17

## 2025-02-17 RX ORDER — ALBUTEROL SULFATE 90 UG/1
2 INHALANT RESPIRATORY (INHALATION)
Qty: 1 EACH | Refills: 0 | Status: SHIPPED | OUTPATIENT
Start: 2025-02-17

## 2025-02-17 NOTE — PROGRESS NOTES
CHIEF COMPLAINT:     Chief Complaint   Patient presents with    Cough     Sx 2 weeks - Productive cough, chest congestion  Denies ST, sinus pressure, headache, ear pain/pressure, fever, chills, body aches, nasal congestion, runny nose, PND, n/v/d, loss of appetite, SOB, rash  No Covid test was done at home  OTC Mucinex       HPI:   Amador Sadler is a 35 year old male who presents for upper respiratory symptoms for  2 weeks. Patient reports like a sore throat only at the beginning of sx's, cough with yellowish whitish colored sputum.  Associated symptoms include lots of throat clearing, chest congestion, coughing jags, occasional wheezing.  Denies fever, chills, sob, chest/back pain.  Sleep unaffected.  Symptoms have been persistent since onset but feels well otherwise.  Better after hot showers.  Treating symptoms with mucinex and cough drops with temporary relief.    + hx of COVID; No known COVID exposure  No ill contacts.  + recent travel - was in [place] the past 5 days.        Other conditions:   BMI: Body mass index is 23.53 kg/m².      No current outpatient medications on file.      No past medical history on file.   No past surgical history on file.      Social History     Socioeconomic History    Marital status: Single   Tobacco Use    Smoking status: Never     Passive exposure: Never    Smokeless tobacco: Never   Vaping Use    Vaping status: Never Used   Substance and Sexual Activity    Alcohol use: Not Currently     Alcohol/week: 0.0 standard drinks of alcohol    Drug use: No   Other Topics Concern    Caffeine Concern Yes     Comment: Coffee         REVIEW OF SYSTEMS:   GENERAL: feels well otherwise,   normal appetite  SKIN: no rashes or abnormal skin lesions  HEENT: See HPI  LUNGS: denies shortness of breath, See HPI  CARDIOVASCULAR: denies chest pain or palpitations   GI: denies N/V/C or abdominal pain  NEURO: denies dizziness or lightheadedness    EXAM:   /82   Pulse 74   Temp 97.7 °F (36.5 °C)  (Tympanic)   Resp 16   Ht 5' 10\" (1.778 m)   Wt 164 lb (74.4 kg)   SpO2 98%   BMI 23.53 kg/m²     Physical Exam  Vitals reviewed.   Constitutional:       General: He is not in acute distress.     Appearance: Normal appearance. He is not ill-appearing.   HENT:      Head: Normocephalic and atraumatic.      Right Ear: Tympanic membrane and ear canal normal.      Left Ear: Tympanic membrane and ear canal normal.      Nose: Nose normal. No congestion or rhinorrhea.      Right Sinus: No maxillary sinus tenderness or frontal sinus tenderness.      Left Sinus: No maxillary sinus tenderness or frontal sinus tenderness.      Mouth/Throat:      Lips: Pink.      Mouth: Mucous membranes are moist.      Pharynx: Oropharynx is clear. Uvula midline. Postnasal drip present. No posterior oropharyngeal erythema.   Eyes:      General: Lids are normal.      Extraocular Movements: Extraocular movements intact.      Conjunctiva/sclera: Conjunctivae normal.   Cardiovascular:      Rate and Rhythm: Normal rate and regular rhythm.      Heart sounds: Normal heart sounds. No murmur heard.  Pulmonary:      Effort: Pulmonary effort is normal.      Breath sounds: Normal air entry. No transmitted upper airway sounds (neg egophony). Wheezing (sporadic) present. No decreased breath sounds, rhonchi or rales.   Musculoskeletal:      Cervical back: Normal range of motion and neck supple.   Lymphadenopathy:      Cervical: No cervical adenopathy.   Skin:     General: Skin is warm and dry.      Findings: No rash.   Neurological:      General: No focal deficit present.      Mental Status: He is alert.   Psychiatric:         Speech: Speech normal.         Behavior: Behavior normal. Behavior is cooperative.          No results found for this or any previous visit (from the past 24 hours).    ASSESSMENT AND PLAN:   Amador Sadler is a 35 year old male who presents with     ASSESSMENT:   Encounter Diagnosis   Name Primary?    Bronchitis Yes       PLAN:    Reviewed symptom relief measures with patient.   Monitor for worsening symptoms.  Comfort care as described in Patient Instructions  Medications as below.      Meds & Refills for this Visit:  Requested Prescriptions     Signed Prescriptions Disp Refills    albuterol 108 (90 Base) MCG/ACT Inhalation Aero Soln 1 each 0     Sig: Inhale 2 puffs into the lungs every 4 to 6 hours as needed.    benzonatate 200 MG Oral Cap 30 capsule 0     Sig: Take 1 capsule (200 mg total) by mouth 3 (three) times daily as needed for cough.    predniSONE 20 MG Oral Tab 10 tablet 0     Sig: Take 2 tablets (40 mg total) by mouth daily for 5 days. Avoid taking at nighttime.       Risks, benefits, and side effects of medication explained and discussed.  To f/u with PCP if sx do not resolve as anticipated.  The patient indicates understanding of these issues and agrees to the plan.        There are no Patient Instructions on file for this visit.

## 2025-03-20 ENCOUNTER — OFFICE VISIT (OUTPATIENT)
Dept: FAMILY MEDICINE CLINIC | Facility: CLINIC | Age: 35
End: 2025-03-20
Payer: COMMERCIAL

## 2025-03-20 VITALS
TEMPERATURE: 98 F | BODY MASS INDEX: 23.62 KG/M2 | WEIGHT: 165 LBS | HEIGHT: 70 IN | HEART RATE: 87 BPM | DIASTOLIC BLOOD PRESSURE: 70 MMHG | SYSTOLIC BLOOD PRESSURE: 124 MMHG | OXYGEN SATURATION: 98 % | RESPIRATION RATE: 16 BRPM

## 2025-03-20 DIAGNOSIS — B02.9 HERPES ZOSTER WITHOUT COMPLICATION: Primary | ICD-10-CM

## 2025-03-20 PROCEDURE — 3008F BODY MASS INDEX DOCD: CPT | Performed by: NURSE PRACTITIONER

## 2025-03-20 PROCEDURE — 99213 OFFICE O/P EST LOW 20 MIN: CPT | Performed by: NURSE PRACTITIONER

## 2025-03-20 PROCEDURE — 3074F SYST BP LT 130 MM HG: CPT | Performed by: NURSE PRACTITIONER

## 2025-03-20 PROCEDURE — 3078F DIAST BP <80 MM HG: CPT | Performed by: NURSE PRACTITIONER

## 2025-03-20 RX ORDER — VALACYCLOVIR HYDROCHLORIDE 1 G/1
1000 TABLET, FILM COATED ORAL 3 TIMES DAILY
Qty: 21 TABLET | Refills: 0 | Status: SHIPPED | OUTPATIENT
Start: 2025-03-20 | End: 2025-03-27

## 2025-03-20 NOTE — PATIENT INSTRUCTIONS
1. Take Valtrex as prescribed. Complete entire course  2. Do not come into contact with pregnant women, infants who have not had chicken pox or varicella vaccine, or other persons who have not had chicken pox.  3. Perform good hand hygiene often  4. The rash if extremely contagious in blister phase. The rash will change and crust over becoming non contagious.  5. Avoid itching/scratching area as this may spread the rash or cause secondary skin infections  6. You make take zyrtec OTC for itching. Use as directed only.  7. For pain use analgesics such as OTC oral tylenol or ibuprofen as directed.   8. Topical demeboro solution for irritation, pain.  9. Do NOT use creams or lotions on the rash that contain fragrance/chemicals. Avoid rubbing the area with hands.    *Follow up with PCP and Ophthalmology as discussed

## 2025-03-20 NOTE — PROGRESS NOTES
Subjective:   Patient ID: Amador Sadler is a 35 year old male.    Patient is a 35 year old male who presents today with complaints of a throbbing sensation to his right temple x 2 days. The skin in that area also feels dry. He did note a pimple that erupted but quickly resolved. Denies headaches, fevers, body aches, chills, eye pain, visual changes, photophobia, conjunctivitis, eye watering or discharge. No rashes to face or other areas of body. Tolerating PO well at home. Attempted treatment prior to arrival = none. Last summer patient notes a similar sensation to the same area (right temporal region) and was diagnosed with shingles. This feels the same however no rash has erupted yet.    Of note, patient has been sick with URI symptoms x the end of January. Recently finished Augmentin for a sinus infection. Prior to that visit, he was on a short course of oral steroids. URI overall is better, cough is lingering.             History/Other:   Review of Systems   Constitutional:  Negative for appetite change, chills and fever.   HENT:  Negative for congestion, rhinorrhea and sore throat.    Eyes:  Negative for photophobia, pain, discharge, redness and visual disturbance.   Respiratory:  Positive for cough.    Skin:  Negative for rash.   Neurological:  Negative for headaches.     Current Outpatient Medications   Medication Sig Dispense Refill    valACYclovir 1 G Oral Tab Take 1 tablet (1,000 mg total) by mouth 3 (three) times daily for 7 days. 21 tablet 0    albuterol 108 (90 Base) MCG/ACT Inhalation Aero Soln Inhale 2 puffs into the lungs every 4 to 6 hours as needed. (Patient not taking: Reported on 3/20/2025) 1 each 0     Allergies:Allergies[1]    /70   Pulse 87   Temp 98 °F (36.7 °C) (Tympanic)   Resp 16   Ht 5' 10\" (1.778 m)   Wt 165 lb (74.8 kg)   SpO2 98%   BMI 23.68 kg/m²       Visual Acuity     Vision Screen Test Type: Snellen Wall Chart    Right Eye Visual Acuity: Corrected Right Eye Chart  Acuity: 20/20   Left Eye Visual Acuity: Corrected Left Eye Chart Acuity: 20/20             Objective:   Physical Exam  Vitals reviewed.   Constitutional:       General: He is awake. He is not in acute distress.     Appearance: Normal appearance. He is well-developed and well-groomed. He is not ill-appearing, toxic-appearing or diaphoretic.   HENT:      Head: Normocephalic and atraumatic.     Eyes:      General: Lids are normal. Vision grossly intact.      Extraocular Movements: Extraocular movements intact.      Conjunctiva/sclera: Conjunctivae normal.      Pupils: Pupils are equal, round, and reactive to light. Pupils are equal.   Cardiovascular:      Rate and Rhythm: Normal rate and regular rhythm.   Pulmonary:      Effort: Pulmonary effort is normal. No respiratory distress.      Breath sounds: Normal breath sounds and air entry. No decreased breath sounds, wheezing, rhonchi or rales.   Skin:     General: Skin is warm and dry.      Findings: No rash.   Neurological:      Mental Status: He is alert and oriented to person, place, and time.   Psychiatric:         Behavior: Behavior is cooperative.         Assessment & Plan:   1. Herpes zoster without complication        No orders of the defined types were placed in this encounter.      Meds This Visit:  Requested Prescriptions     Signed Prescriptions Disp Refills    valACYclovir 1 G Oral Tab 21 tablet 0     Sig: Take 1 tablet (1,000 mg total) by mouth 3 (three) times daily for 7 days.     Reassuring physical exam findings. Vitals WNL.   Discussed due to HPI, duration of symptoms and clinical exam findings - will treat today for suspected shingles with Valtrex.  Patient is scheduled to follow up with his PCP and also plans to follow up with Ophthalmology as well.  Supportive care and return to care measures reviewed.  Patient v/u and is comfortable with this plan.    Patient Instructions   1. Take Valtrex as prescribed. Complete entire course  2. Do not come into  contact with pregnant women, infants who have not had chicken pox or varicella vaccine, or other persons who have not had chicken pox.  3. Perform good hand hygiene often  4. The rash if extremely contagious in blister phase. The rash will change and crust over becoming non contagious.  5. Avoid itching/scratching area as this may spread the rash or cause secondary skin infections  6. You make take zyrtec OTC for itching. Use as directed only.  7. For pain use analgesics such as OTC oral tylenol or ibuprofen as directed.   8. Topical demeboro solution for irritation, pain.  9. Do NOT use creams or lotions on the rash that contain fragrance/chemicals. Avoid rubbing the area with hands.    *Follow up with PCP and Ophthalmology as discussed           [1] No Known Allergies

## 2025-03-27 ENCOUNTER — OFFICE VISIT (OUTPATIENT)
Dept: FAMILY MEDICINE CLINIC | Facility: CLINIC | Age: 35
End: 2025-03-27

## 2025-03-27 ENCOUNTER — LAB ENCOUNTER (OUTPATIENT)
Dept: LAB | Age: 35
End: 2025-03-27
Attending: FAMILY MEDICINE
Payer: COMMERCIAL

## 2025-03-27 VITALS
HEART RATE: 76 BPM | WEIGHT: 164 LBS | HEIGHT: 70 IN | BODY MASS INDEX: 23.48 KG/M2 | TEMPERATURE: 98 F | SYSTOLIC BLOOD PRESSURE: 125 MMHG | DIASTOLIC BLOOD PRESSURE: 85 MMHG

## 2025-03-27 DIAGNOSIS — R09.89 CHRONIC THROAT CLEARING: ICD-10-CM

## 2025-03-27 DIAGNOSIS — R09.82 POST-NASAL DRIP: ICD-10-CM

## 2025-03-27 DIAGNOSIS — B00.9 HERPES SIMPLEX: Primary | ICD-10-CM

## 2025-03-27 DIAGNOSIS — L21.9 SEBORRHEIC DERMATITIS: ICD-10-CM

## 2025-03-27 DIAGNOSIS — L98.9 BENIGN SKIN LESION OF FACE: ICD-10-CM

## 2025-03-27 DIAGNOSIS — R05.3 CHRONIC COUGH: ICD-10-CM

## 2025-03-27 PROCEDURE — G2211 COMPLEX E/M VISIT ADD ON: HCPCS | Performed by: FAMILY MEDICINE

## 2025-03-27 PROCEDURE — 36415 COLL VENOUS BLD VENIPUNCTURE: CPT

## 2025-03-27 PROCEDURE — 3008F BODY MASS INDEX DOCD: CPT | Performed by: FAMILY MEDICINE

## 2025-03-27 PROCEDURE — 86003 ALLG SPEC IGE CRUDE XTRC EA: CPT

## 2025-03-27 PROCEDURE — 82785 ASSAY OF IGE: CPT

## 2025-03-27 PROCEDURE — 3079F DIAST BP 80-89 MM HG: CPT | Performed by: FAMILY MEDICINE

## 2025-03-27 PROCEDURE — 99214 OFFICE O/P EST MOD 30 MIN: CPT | Performed by: FAMILY MEDICINE

## 2025-03-27 PROCEDURE — 3074F SYST BP LT 130 MM HG: CPT | Performed by: FAMILY MEDICINE

## 2025-03-27 NOTE — PATIENT INSTRUCTIONS
GENERIC ALLEGRA 180 mg    Get over-the-counter Allegra 180 mg but just get the generic which is Fexofenadine 180 mg and take daily.  It does not make you tired.

## 2025-03-27 NOTE — PROGRESS NOTES
Patient ID: Amador Sadler is a 35 year old male.         The following individual(s) verbally consented to be recorded using ambient AI listening technology and understand that they can each withdraw their consent to this listening technology at any point by asking the clinician to turn off or pause the recording:    Patient name: Amador Sadler  Additional names:           HPI  Chief Complaint   Patient presents with    Follow - Up     Shingles.      Nara Mccain APRN   Nurse Practitioner  Specialty: Nurse Practitioner Family     Progress Notes     Signed     Encounter Date: 3/20/2025     Signed       Expand All Collapse All       Subjective:  Patient ID: Amador Sadler is a 35 year old male.     Patient is a 35 year old male who presents today with complaints of a throbbing sensation to his right temple x 2 days. The skin in that area also feels dry. He did note a pimple that erupted but quickly resolved. Denies headaches, fevers, body aches, chills, eye pain, visual changes, photophobia, conjunctivitis, eye watering or discharge. No rashes to face or other areas of body. Tolerating PO well at home. Attempted treatment prior to arrival = none. Last summer patient notes a similar sensation to the same area (right temporal region) and was diagnosed with shingles. This feels the same however no rash has erupted yet.     Of note, patient has been sick with URI symptoms x the end of January. Recently finished Augmentin for a sinus infection. Prior to that visit, he was on a short course of oral steroids. URI overall is better, cough is lingering.               History of Present Illness  Amador Sadler is a 35 year old male who presents with concerns of shingles recurrence and a persistent cough.    He experiences a sensation by the right temple, similar to a previous episode of shingles last year near the right eye.  During this episode he noticed a small bump lateral to the right eye that grew slightly  larger but there were never any vesicles.  He thought perhaps there was some throbbing feeling but there is no burning.  He had no vision issues or rash to the eyelids.  He was treated with Valtrex for seven days and is currently finishing the course with two pills remaining. The current sensation is described as throbbing, similar to the previous episode, but there is no blistering rash this time. No current blistering rash, conjunctivitis, or inflammation in the eyes.    He did show me a picture from 8/27/2024 where he had a slightly larger than dime size lesion in the right supraorbital area that showed dried lesions consistent with herpes simplex.  He has a persistent cough ongoing since January. The cough is productive, with clear to yellowish mucus, primarily occurring in the morning. He denies feeling sick otherwise and reports normal sleep. Various treatments have been tried, including prednisone, an inhaler, Tessalon, albuterol, and Augmentin, which was completed recently. Despite these treatments, the cough persists. He used to sneeze frequently but has not since the onset of the cough. Frequent throat clearing, especially after eating lunch, seems to improve the symptoms. He has not taken allergy medications like Zyrtec or Allegra.    He has a history of a reddish rash on his face, described as rough and scabby, with flakiness around the nasolabial folds. He has not seen a dermatologist for this issue. The rash is not itchy or painful, but he is concerned about its appearance.    Wt Readings from Last 6 Encounters:   03/27/25 164 lb (74.4 kg)   03/20/25 165 lb (74.8 kg)   02/17/25 164 lb (74.4 kg)   06/10/24 170 lb 6.4 oz (77.3 kg)   05/27/24 170 lb (77.1 kg)   01/31/24 170 lb (77.1 kg)       BMI Readings from Last 6 Encounters:   03/27/25 23.53 kg/m²   03/20/25 23.68 kg/m²   02/17/25 23.53 kg/m²   06/10/24 24.45 kg/m²   05/27/24 24.39 kg/m²   01/31/24 23.71 kg/m²       BP Readings from Last 6 Encounters:    03/27/25 125/85   03/20/25 124/70   02/17/25 128/82   12/12/24 128/84   06/10/24 108/75   05/27/24 143/88       Results  LABS  CBC: WBC 6.3 (12/12/2024)  BMP: Glucose 100 (12/12/2024)    Review of Systems  No exertional cardiac chest pain or shortness of breath unless stated in HPI which would take precedence.  See HPI for further review of systems.        Medical History:      History reviewed. No pertinent past medical history.    History reviewed. No pertinent surgical history.       Current Outpatient Medications   Medication Sig Dispense Refill    valACYclovir 1 G Oral Tab Take 1 tablet (1,000 mg total) by mouth 3 (three) times daily for 7 days. 21 tablet 0    albuterol 108 (90 Base) MCG/ACT Inhalation Aero Soln Inhale 2 puffs into the lungs every 4 to 6 hours as needed. (Patient not taking: Reported on 3/27/2025) 1 each 0     Allergies:Allergies[1]     Physical Exam:       Physical Exam  Blood pressure 125/85, pulse 76, temperature 97.7 °F (36.5 °C), temperature source Tympanic, height 5' 10\" (1.778 m), weight 164 lb (74.4 kg).       Physical Exam   Constitutional: Patient is oriented to person, place, and time. Patient appears well-developed and well-nourished. No distress.   HENT:   Head: Normocephalic and atraumatic.   Neck: Normal range of motion. Neck supple. No thyromegaly present.   Lymphadenopathy:     Has no cervical adenopathy.   Cardiovascular: Normal rate, regular rhythm and no murmur heard.  Pulmonary/Chest: Effort normal and breath sounds normal. No respiratory distress.   Neurological: Patient is alert and oriented to person, place, and time.   Skin: Skin is warm and dry.  No pallor or diaphoresis.  Psychiatric: Patient has a normal mood and affect.   Legs: No pitting edema.    Nursing note and vitals reviewed.      Physical Exam  HEENT: No conjunctivitis in right eye. Pupils reactive to light, no inflammation. Nasal passages clear.  SKIN: Reddish rash on cheeks and forehead.       Assessment/Plan:        Diagnoses and all orders for this visit:    Herpes simplex  -     DERM - INTERNAL  The pictures that he showed me looks like herpes simplex but not herpes zoster from last year.  This episode is clearly not herpes zoster.  I showed him pictures of how that truly looks.  Benign skin lesion of face  -     DERM - INTERNAL  This papule has resolved  Seborrheic dermatitis  -     DERM - INTERNAL  The redness on his face I think is seborrheic dermatitis and would like to have him see a dermatologist  Chronic cough  -     Allergen [facility]; Future  I think this is due to allergies and we will do a blood test to see.   Patient Instructions   GENERIC ALLEGRA 180 mg    Get over-the-counter Allegra 180 mg but just get the generic which is Fexofenadine 180 mg and take daily.  It does not make you tired.     Chronic throat clearing  -     Allergen [facility]; Future    Post-nasal drip  -     Allergen [facility]; Future        Referrals (if applicable)  Orders Placed This Encounter   Procedures    DERM - INTERNAL     If he is busy, you may see one of his partners.    He is got a redness to the face that may be consistent with seborrheic dermatitis.  Can you confirm this?     Referral Priority:   Routine     Referral Type:   OFFICE VISIT     Referred to Provider:   Lamont Sánchez MD     Requested Specialty:   DERMATOLOGY     Number of Visits Requested:   3         Follow up if symptoms persist.  Take medicine (if given) as prescribed.  Approach to treatment discussed and patient/family member understands and agrees to plan.     No follow-ups on file.      Assessment & Plan  Chronic Cough  Persistent cough since January, productive of clear to yellowish mucus, primarily in the morning. Previous treatments included prednisone, albuterol, Tessalon, and Augmentin. Chest X-ray from December 2024 was clear. Symptoms suggest possible postnasal drip or allergy-related etiology  rather than infection. Allergy testing could identify potential triggers.  - Order blood test for allergies  - Recommend over-the-counter fexofenadine 180 mg daily  - Evaluate results of allergy testing to determine further management    Herpes Zoster (Shingles)  Recurrent sensation by the right temple, previously diagnosed as shingles. Currently on Valtrex with two pills remaining. No new blistering rash observed. Previous episode near the eye in May 2024. Current symptoms do not suggest active shingles as there is no significant pain or blistering. No indication for ophthalmology referral at this time.  - Complete current course of Valtrex    Seborrheic Dermatitis  Presence of reddish rash and flakiness on the face, particularly around the cheeks, forehead, and nasolabial folds. Differential diagnosis includes seborrheic dermatitis, characterized by dandruff-like flakiness on the face. Potential use of antifungal shampoo for facial application if confirmed by dermatologist.  - Refer to dermatologist for further evaluation and confirmation of seborrheic dermatitis  - Consider use of antifungal shampoo for facial application if confirmed by dermatologist    Cristian Cisse DO  3/27/2025        [1] No Known Allergies

## 2025-04-02 LAB
A ALTERNATA IGE QN: <0.1 KUA/L (ref ?–0.1)
C HERBARUM IGE QN: <0.1 KUA/L (ref ?–0.1)
CAT DANDER IGE QN: <0.1 KUA/L (ref ?–0.1)
COMMON RAGWEED IGE QN: <0.1 KUA/L (ref ?–0.1)
D FARINAE IGE QN: <0.1 KUA/L (ref ?–0.1)
DOG DANDER IGE QN: <0.1 KUA/L (ref ?–0.1)
GOOSEFOOT IGE QN: <0.1 KUA/L (ref ?–0.1)
HOUSE DUST HS IGE QN: <0.1 KUA/L (ref ?–0.1)
IGE SERPL-ACNC: 93.4 KU/L (ref 2–214)
KENT BLUE GRASS IGE QN: <0.1 KUA/L (ref ?–0.1)
PER RYE GRASS IGE QN: <0.1 KUA/L (ref ?–0.1)
WHITE ELM IGE QN: <0.1 KUA/L (ref ?–0.1)
WHITE OAK IGE QN: <0.1 KUA/L (ref ?–0.1)

## 2025-04-08 ENCOUNTER — PATIENT MESSAGE (OUTPATIENT)
Dept: FAMILY MEDICINE CLINIC | Facility: CLINIC | Age: 35
End: 2025-04-08

## 2025-04-08 ENCOUNTER — TELEPHONE (OUTPATIENT)
Dept: FAMILY MEDICINE CLINIC | Facility: CLINIC | Age: 35
End: 2025-04-08

## 2025-04-08 DIAGNOSIS — H93.8X9 EAR CONGESTION, UNSPECIFIED LATERALITY: ICD-10-CM

## 2025-04-08 DIAGNOSIS — R05.3 CHRONIC COUGH: Primary | ICD-10-CM

## 2025-04-08 DIAGNOSIS — R09.89 CHRONIC THROAT CLEARING: ICD-10-CM

## 2025-04-08 NOTE — TELEPHONE ENCOUNTER
HAIM Jones (pod mate=on behalf of DR Cisse ) ==referral pending for approval. Thanks.     Per patient, he's been   coming and going to the physician due to his cough since January 2025..He already completed  the prednisone, antibiotic and cough capsule but symptom still active.      Current symptoms ==productive cough with clear to yellowish phlegm,,  frequently blowing nose and left  ear congestion .    RN offered an appointment, but prefers to be seen by an ENT specialist, even if the appointment is far. Patient is requesting an ENT referral.  Patient has HMO insurance .       Advised to use over the counter Flonase nasal spray , push fluids and urgent care or immediate care  for worsening symptoms .      Per chart review, Dr. Cisse is finished for the day and will return on 4/15/25.

## 2025-04-09 NOTE — TELEPHONE ENCOUNTER
Message noted. Patient has appointment 4/10/25.   Automatic message sent to patient about referral today.     No further action in this encounter needed.

## 2025-04-10 ENCOUNTER — OFFICE VISIT (OUTPATIENT)
Dept: OTOLARYNGOLOGY | Facility: CLINIC | Age: 35
End: 2025-04-10
Payer: COMMERCIAL

## 2025-04-10 DIAGNOSIS — R05.3 CHRONIC COUGH: Primary | ICD-10-CM

## 2025-04-10 PROCEDURE — 99203 OFFICE O/P NEW LOW 30 MIN: CPT | Performed by: OTOLARYNGOLOGY

## 2025-04-10 PROCEDURE — 31575 DIAGNOSTIC LARYNGOSCOPY: CPT | Performed by: OTOLARYNGOLOGY

## 2025-04-10 RX ORDER — MONTELUKAST SODIUM 10 MG/1
10 TABLET ORAL NIGHTLY
Qty: 30 TABLET | Refills: 3 | Status: SHIPPED | OUTPATIENT
Start: 2025-04-10

## 2025-04-10 RX ORDER — AZELASTINE 1 MG/ML
2 SPRAY, METERED NASAL 2 TIMES DAILY
Qty: 30 ML | Refills: 3 | Status: SHIPPED | OUTPATIENT
Start: 2025-04-10

## 2025-04-10 RX ORDER — LEVOFLOXACIN 500 MG/1
500 TABLET, FILM COATED ORAL EVERY 24 HOURS
Qty: 10 TABLET | Refills: 0 | Status: SHIPPED | OUTPATIENT
Start: 2025-04-10

## 2025-04-10 NOTE — PROGRESS NOTES
Amador Sadler is a 35 year old male.    Chief Complaint   Patient presents with    Cough     Chronic cough x3 months  Pt reports coughing up phlegm       HISTORY OF PRESENT ILLNESS  He presents with a 3-month history of a chronic cough.  He has been on previous antibiotics as well as steroids without any improvement in his symptoms.  States that more recently he has noted some yellowish material that he blows out of his nose or will cough up.  Constant throat clearing does not believe it is coming from the lungs does note nasal congestion.  No other signs, symptoms or complaint testing no formal treatment.  Sent by Dr. Cisse for my opinion regarding his symptoms.      Social Hx on file[1]    Family History[2]    Past Medical History[3]    Past Surgical History[4]      REVIEW OF SYSTEMS    System Neg/Pos Details   Constitutional Negative Fatigue, fever and weight loss.   ENMT Negative Drooling.   Eyes Negative Blurred vision and vision changes.   Respiratory Negative Dyspnea and wheezing.   Cardio Negative Chest pain, irregular heartbeat/palpitations and syncope.   GI Negative Abdominal pain and diarrhea.   Endocrine Negative Cold intolerance and heat intolerance.   Neuro Negative Tremors.   Psych Negative Anxiety and depression.   Integumentary Negative Frequent skin infections, pigment change and rash.   Hema/Lymph Negative Easy bleeding and easy bruising.           PHYSICAL EXAM    There were no vitals taken for this visit.       Constitutional Normal Overall appearance - Normal.   Psychiatric Normal Orientation - Oriented to time, place, person & situation. Appropriate mood and affect.   Neck Exam Normal Inspection - Normal. Palpation - Normal. Parotid gland - Normal. Thyroid gland - Normal.   Eyes Normal Conjunctiva - Right: Normal, Left: Normal. Pupil - Right: Normal, Left: Normal. Fundus - Right: Normal, Left: Normal.   Neurological Normal Memory - Normal. Cranial nerves - Cranial nerves II through XII  grossly intact.   Head/Face Normal Facial features - Normal. Eyebrows - Normal. Skull - Normal.        Nasopharynx Normal External nose - Normal. Lips/teeth/gums - Normal. Tonsils - Normal. Oropharynx - Normal.   Ears Normal Inspection - Right: Normal, Left: Normal. Canal - Right: Normal, Left: Normal. TM - Right: Normal, Left: Normal.   Skin Normal Inspection - Normal.        Lymph Detail Normal Submental. Submandibular. Anterior cervical. Posterior cervical. Supraclavicular.        Nose/Mouth/Throat Normal External nose - Normal. Lips/teeth/gums - Normal. Tonsils - Normal. Oropharynx -postnasal discharge with erythema   Nose/Mouth/Throat Normal Nares - Right: Normal Left: Normal. Septum -Normal  Turbinates - Right: Normal, Left: Normal.  Nasal mucosa congested   Procedures:  Endoscopy/Laryngoscopy  Pre-Procedure Care: Verbal consent was obtained. Procedure/risks were explained. Questions were answered. Correct patient identified. Correct side and site confirmed.      A topical spray of ).25% Neosynephrine was sprayed into the nose.    Laryngoscopy:  Flexible Fiberoptic Laryngoscopy: A diagnostic flexible fiberoptic laryngoscopy was performed. The flexible fiberoptic laryngoscope was placed into the nose or mouthand advanced  into the interior of the larynx. A thorough examination of the interior of the larynx was performed.   Findings were as follows.       Hypopharynx/Larynx:  Epiglottis is normal.  Arytenoids:  Bilateral: Arytenoids are normal.  Vocal folds-false  Bilateral: Vocal folds (false) are normal.  Vocal folds-true  Bilateral: Vocal folds (true) are normal.  Pyriform sinus:  Bilateral: Pyriform sinuses are normal.  Base of tongue is normal in appearance.  There is no airway obstruction.   General comments: Erythema of the laryngeal structures.  Very congested nasal mucosa with significant postnasal discharge and mucopurulence noted posteriorly in the nasal pharynx.  No lesions masses polyps nodules or  other abnormalities of the laryngeal structures.            Medications - Current[5]  ASSESSMENT AND PLAN    1. Chronic cough  Laryngoscopy fairly benign other than significant postnasal discharge and erythema of the laryngeal structures.  I do suspect chronic postnasal discharge perhaps from a chronic sinusitis causing him to have a chronic cough.  Start Levaquin for 10 days Singulair loratadine the Astelin nasal spray for reevaluation.  If doing better will consider weaning medications  - LARYNGOSCOPY,FLEX FIBER,DIAGNOSTIC  - loratadine-pseudoephedrine ER 5-120 MG Oral Tablet 12 Hr; Take 1 tablet by mouth every 12 (twelve) hours.  Dispense: 60 tablet; Refill: 3        This note was prepared using Dragon Medical voice recognition dictation software. As a result errors may occur. When identified these errors have been corrected. While every attempt is made to correct errors during dictation discrepancies may still exist    Ashutosh Ruff MD    4/10/2025    2:25 PM         [1]   Social History  Socioeconomic History    Marital status: Single   Tobacco Use    Smoking status: Never     Passive exposure: Never    Smokeless tobacco: Never   Vaping Use    Vaping status: Never Used   Substance and Sexual Activity    Alcohol use: Not Currently     Alcohol/week: 0.0 standard drinks of alcohol    Drug use: No   Other Topics Concern    Caffeine Concern Yes     Comment: Coffee   [2]   Family History  Problem Relation Age of Onset    Other (Other) Father         Hernia   [3] History reviewed. No pertinent past medical history.  [4] History reviewed. No pertinent surgical history.  [5]   Current Outpatient Medications:     montelukast 10 MG Oral Tab, Take 1 tablet (10 mg total) by mouth nightly., Disp: 30 tablet, Rfl: 3    loratadine-pseudoephedrine ER 5-120 MG Oral Tablet 12 Hr, Take 1 tablet by mouth every 12 (twelve) hours., Disp: 60 tablet, Rfl: 3    azelastine 0.1 % Nasal Solution, 2 sprays by Nasal route 2 (two) times  daily., Disp: 30 mL, Rfl: 3    levoFLOXacin 500 MG Oral Tab, Take 1 tablet (500 mg total) by mouth daily., Disp: 10 tablet, Rfl: 0    albuterol 108 (90 Base) MCG/ACT Inhalation Aero Soln, Inhale 2 puffs into the lungs every 4 to 6 hours as needed. (Patient not taking: Reported on 4/10/2025), Disp: 1 each, Rfl: 0

## 2025-05-05 ENCOUNTER — OFFICE VISIT (OUTPATIENT)
Dept: DERMATOLOGY CLINIC | Facility: CLINIC | Age: 35
End: 2025-05-05
Payer: COMMERCIAL

## 2025-05-05 DIAGNOSIS — L71.9 ROSACEA: ICD-10-CM

## 2025-05-05 DIAGNOSIS — R21 RASH AND NONSPECIFIC SKIN ERUPTION: Primary | ICD-10-CM

## 2025-05-05 DIAGNOSIS — L21.9 SEBORRHEIC DERMATITIS: ICD-10-CM

## 2025-05-05 PROCEDURE — 99204 OFFICE O/P NEW MOD 45 MIN: CPT | Performed by: STUDENT IN AN ORGANIZED HEALTH CARE EDUCATION/TRAINING PROGRAM

## 2025-05-05 RX ORDER — HYDROCORTISONE 25 MG/G
OINTMENT TOPICAL
Qty: 28 G | Refills: 2 | Status: SHIPPED | OUTPATIENT
Start: 2025-05-05

## 2025-05-05 NOTE — PROGRESS NOTES
New patient     Referred by:   Cristian Cisse DO  [address]  [address]  [place] [address]     CHIEF COMPLAINT: Dryness     HISTORY OF PRESENT ILLNESS: .    1. Dryness   Location: Face    Duration: Ongoing   Bleeding, growing, changing?: No  Scaly?:Yes  Itchy?:No    Current treatment: None   Past treatments: Valacyclovir -thought to be shingles by PCP       DERM HISTORY:  History of skin cancer: No  History of chronic skin disease/condition: No    FAMILY HISTORY:  History of melanoma: No    History/Other:    REVIEW OF SYSTEMS:  Constitutional: Denies fever, chills, unintentional weight loss.   Skin as per HPI    PAST MEDICAL HISTORY:  Past Medical History[1]    Medications  Current Medications[2]    Objective:    PHYSICAL EXAM:  General: awake, alert, no acute distress  Skin: Skin exam was performed today including the following: face. Pertinent findings include:   - with few yellow greasy scaling   - with few erythematous patches    ASSESSMENT & PLAN:  Pathophysiology of diagnoses discussed with patient.  Therapeutic options reviewed. Risks, benefits, and alternatives discussed with patient. Instructions reviewed at length.    #Rash and nonspecific skin eruption  - Clear will continue to monitor     #Rosacea   - Ivermectin 0.9%, metronidazole 1% azelaic acid 15% niacinamide 5% compound once daily to face    #Seborrheic dermatitis  - We discussed the etiology, natural history, and chronic, waxing/waning nature of seborrheic dermatitis. Educated patient that seborrheic dermatitis is typically a chronic problem due to inflammation in response to yeast (Malassezia species). Discussed that maintenance is desired rather than cure.    For the face:  - hydrocortisone 2.5%  twice daily to affected areas on face Monday-Friday. Take weekends off.         Return to clinic: 6 months or sooner if something concerning arises     Lamont Sánchez MD    By signing my name below, I, Chan MARINA MA,  attest that this  documentation has been prepared under the direction and in the presence of Lamont Sánchez MD.   Electronically Signed: Chan MARINA MA, 5/5/2025, 2:11 PM.    I, Lamont Sánchez MD,  personally performed the services described in this documentation. All medical record entries made by the scribe were at my direction and in my presence.  I have reviewed the chart and agree that the record reflects my personal performance and is accurate and complete.  Lamont Sánchez MD, 5/5/2025, 2:41 PM           [1] No past medical history on file.  [2]   Current Outpatient Medications   Medication Sig Dispense Refill    montelukast 10 MG Oral Tab Take 1 tablet (10 mg total) by mouth nightly. 30 tablet 3    loratadine-pseudoephedrine ER 5-120 MG Oral Tablet 12 Hr Take 1 tablet by mouth every 12 (twelve) hours. 60 tablet 3    azelastine 0.1 % Nasal Solution 2 sprays by Nasal route 2 (two) times daily. 30 mL 3    levoFLOXacin 500 MG Oral Tab Take 1 tablet (500 mg total) by mouth daily. 10 tablet 0    albuterol 108 (90 Base) MCG/ACT Inhalation Aero Soln Inhale 2 puffs into the lungs every 4 to 6 hours as needed. (Patient not taking: Reported on 4/10/2025) 1 each 0

## 2025-05-13 ENCOUNTER — OFFICE VISIT (OUTPATIENT)
Dept: OTOLARYNGOLOGY | Facility: CLINIC | Age: 35
End: 2025-05-13
Payer: COMMERCIAL

## 2025-05-13 DIAGNOSIS — R05.3 CHRONIC COUGH: Primary | ICD-10-CM

## 2025-05-13 PROCEDURE — 99213 OFFICE O/P EST LOW 20 MIN: CPT | Performed by: OTOLARYNGOLOGY

## 2025-05-13 NOTE — PROGRESS NOTES
Amador Sadler is a 35 year old male.    Chief Complaint   Patient presents with    Follow - Up     Patient is here due to chronic cough follow up        HISTORY OF PRESENT ILLNESS  He presents with a 3-month history of a chronic cough.  He has been on previous antibiotics as well as steroids without any improvement in his symptoms.  States that more recently he has noted some yellowish material that he blows out of his nose or will cough up.  Constant throat clearing does not believe it is coming from the lungs does note nasal congestion.  No other signs, symptoms or complaint testing no formal treatment.  Sent by Dr. Cisse for my opinion regarding his symptoms.     5/13/25 last visit he was started on Claritin-D Singulair Astelin nasal spray and has had complete resolution of his cough that has had for 4 months now.  Still feels that there is a something in the back of his throat that bothers him to some extent.  No pain or discomfort no voice changes no other significant signs, symptoms or complaints.  No longer using Claritin-D as it causes him to wake up at night.  Finished his Singulair yesterday.  Here to discuss future management.  Laryngoscopy revealed erythema but no lesions masses polyps nodules or other abnormalities of the vocal cords or larynx.      Social Hx on file[1]    Family History[2]    Past Medical History[3]    Past Surgical History[4]      REVIEW OF SYSTEMS    System Neg/Pos Details   Constitutional Negative Fatigue, fever and weight loss.   ENMT Negative Drooling.   Eyes Negative Blurred vision and vision changes.   Respiratory Negative Dyspnea and wheezing.   Cardio Negative Chest pain, irregular heartbeat/palpitations and syncope.   GI Negative Abdominal pain and diarrhea.   Endocrine Negative Cold intolerance and heat intolerance.   Neuro Negative Tremors.   Psych Negative Anxiety and depression.   Integumentary Negative Frequent skin infections, pigment change and rash.   Hema/Lymph  Negative Easy bleeding and easy bruising.           PHYSICAL EXAM    There were no vitals taken for this visit.       Constitutional Normal Overall appearance - Normal.   Psychiatric Normal Orientation - Oriented to time, place, person & situation. Appropriate mood and affect.   Neck Exam Normal Inspection - Normal. Palpation - Normal. Parotid gland - Normal. Thyroid gland - Normal.   Eyes Normal Conjunctiva - Right: Normal, Left: Normal. Pupil - Right: Normal, Left: Normal. Fundus - Right: Normal, Left: Normal.   Neurological Normal Memory - Normal. Cranial nerves - Cranial nerves II through XII grossly intact.   Head/Face Normal Facial features - Normal. Eyebrows - Normal. Skull - Normal.        Nasopharynx Normal External nose - Normal. Lips/teeth/gums - Normal. Tonsils - Normal. Oropharynx - Normal.   Ears Normal Inspection - Right: Normal, Left: Normal. Canal - Right: Normal, Left: Normal. TM - Right: Normal, Left: Normal.   Skin Normal Inspection - Normal.        Lymph Detail Normal Submental. Submandibular. Anterior cervical. Posterior cervical. Supraclavicular.        Nose/Mouth/Throat Normal External nose - Normal. Lips/teeth/gums - Normal. Tonsils - Normal. Oropharynx -mild postnasal discharge   Nose/Mouth/Throat Normal Nares - Right: Normal Left: Normal. Septum -slightly deviated to the right turbinates - Right: Normal, Left: Normal.  Mild nasal congestion bilaterally     Medications - Current[5]  ASSESSMENT AND PLAN    1. Chronic cough  Cough completely resolved at this time.  Still feels like there is something in the back of his throat though.  He still continues to appear to have postnasal discharge on examination.  Slight congestion of the nasal mucosa bilaterally.  I have asked him to switch from Claritin-D to plain Claritin daily and to use Sudafed or Claritin-D as needed for congestive issues.  Continue with montelukast and the nasal spray consistently until the summer months and he can wean as  tolerated.  Return to see me in 1 year for refills or sooner for any new problems.        This note was prepared using Dragon [employer] voice recognition dictation software. As a result errors may occur. When identified these errors have been corrected. While every attempt is made to correct errors during dictation discrepancies may still exist    Ashutosh Ruff MD    5/13/2025    8:28 AM         [1]   Social History  Socioeconomic History    Marital status: Single   Tobacco Use    Smoking status: Never     Passive exposure: Never    Smokeless tobacco: Never   Vaping Use    Vaping status: Never Used   Substance and Sexual Activity    Alcohol use: Not Currently     Alcohol/week: 0.0 standard drinks of alcohol    Drug use: No   Other Topics Concern    Caffeine Concern Yes     Comment: Coffee    Reaction to local anesthetic No   [2]   Family History  Problem Relation Age of Onset    Other (Other) Father         Hernia   [3] History reviewed. No pertinent past medical history.  [4] History reviewed. No pertinent surgical history.  [5]   Current Outpatient Medications:     Misc. Devices Does not apply Misc, Compound 40g ivermectin 0.9%, metronidazole 1% azelaic acid 15% niacinamide 5% in gel. Use twice daily to affected areas of face., Disp: 1 each, Rfl: 11    hydrocortisone 2.5 % External Ointment, Apply to the affected areas on face up to twice daily Monday-Friday with flares. Take weekends off., Disp: 28 g, Rfl: 2    montelukast 10 MG Oral Tab, Take 1 tablet (10 mg total) by mouth nightly., Disp: 30 tablet, Rfl: 3    loratadine-pseudoephedrine ER 5-120 MG Oral Tablet 12 Hr, Take 1 tablet by mouth every 12 (twelve) hours., Disp: 60 tablet, Rfl: 3    azelastine 0.1 % Nasal Solution, 2 sprays by Nasal route 2 (two) times daily., Disp: 30 mL, Rfl: 3    levoFLOXacin 500 MG Oral Tab, Take 1 tablet (500 mg total) by mouth daily. (Patient not taking: Reported on 5/5/2025), Disp: 10 tablet, Rfl: 0    albuterol 108 (90 Base)  MCG/ACT Inhalation Aero Soln, Inhale 2 puffs into the lungs every 4 to 6 hours as needed. (Patient not taking: Reported on 5/5/2025), Disp: 1 each, Rfl: 0

## (undated) NOTE — LETTER
Date: 3/1/2019    Patient Name: Jagruti Vyas          To Whom it may concern: This letter has been written at the patient's request. The above patient was seen at the [facility] for treatment of a medical condition.     This patient shou

## (undated) NOTE — LETTER
Vishal Ladd, Aprn  [address]  [place] [address]       04/12/25        Patient: Amador Sadler   YOB: 1990   Date of Visit: 4/10/2025       Dear  Dr. Cisse, DO,      Thank you for referring Amador Sadler to my practice.  Please find my assessment and plan below.    ASSESSMENT AND PLAN    1. Chronic cough  Laryngoscopy fairly benign other than significant postnasal discharge and erythema of the laryngeal structures.  I do suspect chronic postnasal discharge perhaps from a chronic sinusitis causing him to have a chronic cough.  Start Levaquin for 10 days Singulair loratadine the Astelin nasal spray for reevaluation.  If doing better will consider weaning medications  - LARYNGOSCOPY,FLEX FIBER,DIAGNOSTIC  - loratadine-pseudoephedrine ER 5-120 MG Oral Tablet 12 Hr; Take 1 tablet by mouth every 12 (twelve) hours.  Dispense: 60 tablet; Refill: 3                 Sincerely,   Ashutosh Ruff MD   [facility]  [address]  [facility] [id number]    Document electronically generated by:  Ashutosh Ruff MD

## (undated) NOTE — LETTER
Date: 2/27/2019    Patient Name: Dangelo Trivedi          To Whom it may concern: This letter has been written at the patient's request. The above patient was seen at the [facility] for treatment of a medical condition.     This patient john

## (undated) NOTE — Clinical Note
Thank you for the kind referral. Please feel free to reach out with any questions.   Lamont Sánchez MD